# Patient Record
Sex: MALE | Race: WHITE | NOT HISPANIC OR LATINO | Employment: OTHER | RURAL
[De-identification: names, ages, dates, MRNs, and addresses within clinical notes are randomized per-mention and may not be internally consistent; named-entity substitution may affect disease eponyms.]

---

## 2021-04-06 ENCOUNTER — OFFICE VISIT (OUTPATIENT)
Dept: FAMILY MEDICINE | Facility: CLINIC | Age: 71
End: 2021-04-06
Payer: MEDICARE

## 2021-04-06 VITALS
SYSTOLIC BLOOD PRESSURE: 146 MMHG | HEART RATE: 80 BPM | RESPIRATION RATE: 18 BRPM | BODY MASS INDEX: 30.8 KG/M2 | HEIGHT: 71 IN | WEIGHT: 220 LBS | DIASTOLIC BLOOD PRESSURE: 84 MMHG

## 2021-04-06 DIAGNOSIS — R60.0 PEDAL EDEMA: ICD-10-CM

## 2021-04-06 DIAGNOSIS — I87.2 VENOUS STASIS DERMATITIS OF RIGHT LOWER EXTREMITY: Primary | ICD-10-CM

## 2021-04-06 PROCEDURE — 99213 PR OFFICE/OUTPT VISIT, EST, LEVL III, 20-29 MIN: ICD-10-PCS | Mod: ,,, | Performed by: FAMILY MEDICINE

## 2021-04-06 PROCEDURE — 99213 OFFICE O/P EST LOW 20 MIN: CPT | Mod: ,,, | Performed by: FAMILY MEDICINE

## 2021-04-06 RX ORDER — TRIAMCINOLONE ACETONIDE 1 MG/G
OINTMENT TOPICAL 2 TIMES DAILY
Qty: 80 G | Refills: 5 | Status: SHIPPED | OUTPATIENT
Start: 2021-04-06 | End: 2024-02-05

## 2021-04-06 RX ORDER — TRAMADOL HYDROCHLORIDE 50 MG/1
50 TABLET ORAL EVERY 6 HOURS PRN
COMMUNITY
End: 2021-07-28 | Stop reason: SDUPTHER

## 2021-04-06 RX ORDER — ASPIRIN 325 MG
325 TABLET ORAL DAILY
COMMUNITY
End: 2024-02-05

## 2021-07-28 ENCOUNTER — OFFICE VISIT (OUTPATIENT)
Dept: FAMILY MEDICINE | Facility: CLINIC | Age: 71
End: 2021-07-28
Payer: MEDICARE

## 2021-07-28 VITALS
DIASTOLIC BLOOD PRESSURE: 100 MMHG | TEMPERATURE: 61 F | BODY MASS INDEX: 31.36 KG/M2 | HEART RATE: 80 BPM | SYSTOLIC BLOOD PRESSURE: 160 MMHG | HEIGHT: 71 IN | WEIGHT: 224 LBS | RESPIRATION RATE: 16 BRPM

## 2021-07-28 DIAGNOSIS — I87.2 VENOUS STASIS DERMATITIS OF RIGHT LOWER EXTREMITY: ICD-10-CM

## 2021-07-28 DIAGNOSIS — Z79.899 ENCOUNTER FOR LONG-TERM (CURRENT) USE OF OTHER MEDICATIONS: Primary | ICD-10-CM

## 2021-07-28 DIAGNOSIS — M54.9 BACK PAIN, UNSPECIFIED BACK LOCATION, UNSPECIFIED BACK PAIN LATERALITY, UNSPECIFIED CHRONICITY: ICD-10-CM

## 2021-07-28 LAB

## 2021-07-28 PROCEDURE — 99213 OFFICE O/P EST LOW 20 MIN: CPT | Mod: ,,, | Performed by: FAMILY MEDICINE

## 2021-07-28 PROCEDURE — 80305 DRUG TEST PRSMV DIR OPT OBS: CPT | Mod: RHCUB | Performed by: FAMILY MEDICINE

## 2021-07-28 PROCEDURE — 99213 PR OFFICE/OUTPT VISIT, EST, LEVL III, 20-29 MIN: ICD-10-PCS | Mod: ,,, | Performed by: FAMILY MEDICINE

## 2021-07-28 RX ORDER — TRAMADOL HYDROCHLORIDE 50 MG/1
50 TABLET ORAL EVERY 6 HOURS PRN
Qty: 120 TABLET | Refills: 2 | Status: SHIPPED | OUTPATIENT
Start: 2021-07-28 | End: 2021-11-24 | Stop reason: SDUPTHER

## 2021-07-28 RX ORDER — MUPIROCIN 20 MG/G
OINTMENT TOPICAL 3 TIMES DAILY
Qty: 30 G | Refills: 5 | Status: SHIPPED | OUTPATIENT
Start: 2021-07-28

## 2021-09-07 ENCOUNTER — OFFICE VISIT (OUTPATIENT)
Dept: FAMILY MEDICINE | Facility: CLINIC | Age: 71
End: 2021-09-07
Payer: MEDICARE

## 2021-09-07 VITALS
WEIGHT: 224 LBS | DIASTOLIC BLOOD PRESSURE: 94 MMHG | SYSTOLIC BLOOD PRESSURE: 160 MMHG | HEART RATE: 72 BPM | RESPIRATION RATE: 16 BRPM | BODY MASS INDEX: 31.36 KG/M2 | HEIGHT: 71 IN

## 2021-09-07 DIAGNOSIS — I87.319 CHRONIC VENOUS HYPERTENSION W ULCERATION: ICD-10-CM

## 2021-09-07 DIAGNOSIS — L03.115 CELLULITIS OF RIGHT LOWER EXTREMITY: Primary | ICD-10-CM

## 2021-09-07 DIAGNOSIS — L97.909 CHRONIC VENOUS HYPERTENSION W ULCERATION: ICD-10-CM

## 2021-09-07 PROCEDURE — 99213 PR OFFICE/OUTPT VISIT, EST, LEVL III, 20-29 MIN: ICD-10-PCS | Mod: ,,, | Performed by: FAMILY MEDICINE

## 2021-09-07 PROCEDURE — 99213 OFFICE O/P EST LOW 20 MIN: CPT | Mod: ,,, | Performed by: FAMILY MEDICINE

## 2021-09-07 RX ORDER — CEPHALEXIN 500 MG/1
500 CAPSULE ORAL 4 TIMES DAILY
Qty: 40 CAPSULE | Refills: 0 | OUTPATIENT
Start: 2021-09-07 | End: 2023-08-09

## 2021-09-07 RX ORDER — MUPIROCIN 20 MG/G
OINTMENT TOPICAL 3 TIMES DAILY
Qty: 30 G | Refills: 2 | Status: SHIPPED | OUTPATIENT
Start: 2021-09-07 | End: 2024-02-05

## 2021-09-07 RX ORDER — HYDROCODONE BITARTRATE AND ACETAMINOPHEN 7.5; 325 MG/1; MG/1
1 TABLET ORAL EVERY 6 HOURS PRN
Qty: 15 TABLET | Refills: 0 | Status: SHIPPED | OUTPATIENT
Start: 2021-09-07 | End: 2022-11-01 | Stop reason: SDUPTHER

## 2021-11-24 ENCOUNTER — OFFICE VISIT (OUTPATIENT)
Dept: FAMILY MEDICINE | Facility: CLINIC | Age: 71
End: 2021-11-24
Payer: MEDICARE

## 2021-11-24 VITALS
RESPIRATION RATE: 16 BRPM | DIASTOLIC BLOOD PRESSURE: 98 MMHG | HEIGHT: 71 IN | WEIGHT: 230 LBS | TEMPERATURE: 98 F | BODY MASS INDEX: 32.2 KG/M2 | HEART RATE: 80 BPM | SYSTOLIC BLOOD PRESSURE: 170 MMHG

## 2021-11-24 DIAGNOSIS — Z79.899 ENCOUNTER FOR LONG-TERM (CURRENT) USE OF OTHER MEDICATIONS: Primary | ICD-10-CM

## 2021-11-24 DIAGNOSIS — M54.9 BACK PAIN, UNSPECIFIED BACK LOCATION, UNSPECIFIED BACK PAIN LATERALITY, UNSPECIFIED CHRONICITY: ICD-10-CM

## 2021-11-24 LAB
CTP QC/QA: YES
POC (AMP) AMPHETAMINE: NEGATIVE
POC (BAR) BARBITURATES: NEGATIVE
POC (BUP) BUPRENORPHINE: NEGATIVE
POC (BZO) BENZODIAZEPINES: NEGATIVE
POC (COC) COCAINE: NEGATIVE
POC (MDMA) METHYLENEDIOXYMETHAMPHETAMINE 3,4: NEGATIVE
POC (MET) METHAMPHETAMINE: NEGATIVE
POC (MOP) OPIATES: NEGATIVE
POC (MTD) METHADONE: NEGATIVE
POC (OXY) OXYCODONE: NEGATIVE
POC (PCP) PHENCYCLIDINE: NEGATIVE
POC (TCA) TRICYCLIC ANTIDEPRESSANTS: NORMAL
POC TEMPERATURE (URINE): 94
POC THC: NEGATIVE

## 2021-11-24 PROCEDURE — 80305 DRUG TEST PRSMV DIR OPT OBS: CPT | Mod: RHCUB | Performed by: FAMILY MEDICINE

## 2021-11-24 PROCEDURE — 99213 OFFICE O/P EST LOW 20 MIN: CPT | Mod: ,,, | Performed by: FAMILY MEDICINE

## 2021-11-24 PROCEDURE — 99213 PR OFFICE/OUTPT VISIT, EST, LEVL III, 20-29 MIN: ICD-10-PCS | Mod: ,,, | Performed by: FAMILY MEDICINE

## 2021-11-24 RX ORDER — TRAMADOL HYDROCHLORIDE 50 MG/1
50 TABLET ORAL EVERY 6 HOURS PRN
Qty: 120 TABLET | Refills: 5 | Status: SHIPPED | OUTPATIENT
Start: 2021-11-24 | End: 2022-05-17 | Stop reason: SDUPTHER

## 2021-11-29 PROBLEM — M54.9 BACK PAIN: Status: ACTIVE | Noted: 2021-11-29

## 2022-03-11 DIAGNOSIS — Z71.89 COMPLEX CARE COORDINATION: ICD-10-CM

## 2022-05-17 ENCOUNTER — OFFICE VISIT (OUTPATIENT)
Dept: FAMILY MEDICINE | Facility: CLINIC | Age: 72
End: 2022-05-17
Payer: MEDICARE

## 2022-05-17 VITALS
DIASTOLIC BLOOD PRESSURE: 82 MMHG | HEART RATE: 72 BPM | SYSTOLIC BLOOD PRESSURE: 164 MMHG | BODY MASS INDEX: 31.22 KG/M2 | RESPIRATION RATE: 16 BRPM | HEIGHT: 71 IN | WEIGHT: 223 LBS

## 2022-05-17 DIAGNOSIS — H61.91 SKIN LESION OF RIGHT EAR: ICD-10-CM

## 2022-05-17 DIAGNOSIS — M54.9 BACK PAIN, UNSPECIFIED BACK LOCATION, UNSPECIFIED BACK PAIN LATERALITY, UNSPECIFIED CHRONICITY: Primary | ICD-10-CM

## 2022-05-17 DIAGNOSIS — L03.115 CELLULITIS OF RIGHT LOWER EXTREMITY: ICD-10-CM

## 2022-05-17 DIAGNOSIS — Z79.899 ENCOUNTER FOR LONG-TERM (CURRENT) USE OF OTHER MEDICATIONS: ICD-10-CM

## 2022-05-17 DIAGNOSIS — I10 PRIMARY HYPERTENSION: ICD-10-CM

## 2022-05-17 LAB
CTP QC/QA: YES
POC (AMP) AMPHETAMINE: NEGATIVE
POC (BAR) BARBITURATES: NEGATIVE
POC (BUP) BUPRENORPHINE: NEGATIVE
POC (BZO) BENZODIAZEPINES: NEGATIVE
POC (COC) COCAINE: NEGATIVE
POC (MDMA) METHYLENEDIOXYMETHAMPHETAMINE 3,4: NEGATIVE
POC (MET) METHAMPHETAMINE: NEGATIVE
POC (MOP) OPIATES: NEGATIVE
POC (MTD) METHADONE: NEGATIVE
POC (OXY) OXYCODONE: NEGATIVE
POC (PCP) PHENCYCLIDINE: NEGATIVE
POC (TCA) TRICYCLIC ANTIDEPRESSANTS: NORMAL
POC TEMPERATURE (URINE): 90
POC THC: NEGATIVE

## 2022-05-17 PROCEDURE — 99213 PR OFFICE/OUTPT VISIT, EST, LEVL III, 20-29 MIN: ICD-10-PCS | Mod: ,,, | Performed by: FAMILY MEDICINE

## 2022-05-17 PROCEDURE — 99213 OFFICE O/P EST LOW 20 MIN: CPT | Mod: ,,, | Performed by: FAMILY MEDICINE

## 2022-05-17 PROCEDURE — 80305 DRUG TEST PRSMV DIR OPT OBS: CPT | Mod: RHCUB | Performed by: FAMILY MEDICINE

## 2022-05-17 RX ORDER — LISINOPRIL 10 MG/1
10 TABLET ORAL DAILY
Qty: 90 TABLET | Refills: 3 | Status: SHIPPED | OUTPATIENT
Start: 2022-05-17 | End: 2023-07-24 | Stop reason: SDUPTHER

## 2022-05-17 RX ORDER — TRAMADOL HYDROCHLORIDE 50 MG/1
TABLET ORAL
Qty: 150 TABLET | Refills: 5 | Status: SHIPPED | OUTPATIENT
Start: 2022-05-17 | End: 2022-11-01 | Stop reason: SDUPTHER

## 2022-05-18 NOTE — PROGRESS NOTES
Moises Lei MD        PATIENT NAME: Brandt Nicole  : 1950  DATE: 22  MRN: [id number]      Billing Provider: Moises Lei MD  Level of Service: HI OFFICE/OUTPT VISIT, EST, LEVL III, 20-29 MIN  Patient PCP Information     Provider PCP Type    Moises Lei MD General          Reason for Visit / Chief Complaint: Chronic Pain (3 month med check.)       Update PCP  Update Chief Complaint         History of Present Illness / Problem Focused Workflow     Brandt Nicole presents to the clinic with Chronic Pain (3 month med check.)     Patient' follow-up chronic back and knee pain.  Doing well on current medications.      Review of Systems     Review of Systems   Constitutional: Negative for activity change, appetite change, fever and unexpected weight change.   HENT: Negative for congestion, rhinorrhea, sinus pressure, sinus pain, sore throat and trouble swallowing.    Eyes: Negative for photophobia, pain, discharge and visual disturbance.   Respiratory: Negative for cough, chest tightness, wheezing and stridor.    Cardiovascular: Negative for chest pain, palpitations and leg swelling.   Gastrointestinal: Negative for abdominal pain, blood in stool, constipation, diarrhea and nausea.   Endocrine: Negative for polydipsia, polyphagia and polyuria.   Genitourinary: Negative for difficulty urinating, flank pain and hematuria.   Musculoskeletal: Positive for arthralgias and back pain. Negative for neck pain.   Skin: Negative for rash.   Allergic/Immunologic: Negative for food allergies.   Neurological: Negative for dizziness, tremors, seizures, syncope, weakness (global weakness) and headaches.   Psychiatric/Behavioral: Negative for behavioral problems, confusion, decreased concentration, dysphoric mood and hallucinations. The patient is not nervous/anxious.         Medical / Social / Family History   History reviewed. No pertinent past medical history.    No past surgical history on file.    Social  History    reports that he has quit smoking. He has never used smokeless tobacco. He reports that he does not drink alcohol.    Family History  Mr.'s family history is not on file.    Medications and Allergies     Medications  No outpatient medications have been marked as taking for the 5/17/22 encounter (Office Visit) with Moises Lei MD.       Allergies  Review of patient's allergies indicates:   Allergen Reactions    Metoclopramide Other (See Comments)     Panic attack    Tamsulosin hcl Other (See Comments)     Panic attack    Levofloxacin      Anxiety attack       Physical Examination     Vitals:    05/17/22 0846   BP: (!) 164/82   Pulse:    Resp:      Physical Exam  Constitutional:       General: He is not in acute distress.     Appearance: Normal appearance.   HENT:      Head: Normocephalic.      Right Ear: Tympanic membrane and ear canal normal.      Left Ear: Tympanic membrane and ear canal normal.      Nose: Nose normal.      Mouth/Throat:      Mouth: Mucous membranes are moist.      Pharynx: No oropharyngeal exudate.   Eyes:      Extraocular Movements: Extraocular movements intact.      Pupils: Pupils are equal, round, and reactive to light.   Cardiovascular:      Rate and Rhythm: Normal rate and regular rhythm.      Heart sounds: No murmur heard.  Pulmonary:      Effort: Pulmonary effort is normal.      Breath sounds: Normal breath sounds. No wheezing.   Abdominal:      General: Abdomen is flat. Bowel sounds are normal.      Palpations: Abdomen is soft.      Hernia: No hernia is present.   Musculoskeletal:         General: Normal range of motion.      Cervical back: Normal range of motion and neck supple.      Right lower leg: No edema.      Left lower leg: No edema.   Lymphadenopathy:      Cervical: No cervical adenopathy.   Skin:     General: Skin is warm and dry.      Coloration: Skin is not jaundiced.      Findings: No lesion.   Neurological:      General: No focal deficit present.       Mental Status: He is alert and oriented to person, place, and time.      Cranial Nerves: No cranial nerve deficit.      Gait: Gait normal.   Psychiatric:         Mood and Affect: Mood normal.         Behavior: Behavior normal.         Judgment: Judgment normal.          Assessment and Plan (including Health Maintenance)      Problem List  Smart Sets  Document Outside HM   :    Plan:   Back pain, unspecified back location, unspecified back pain laterality, unspecified chronicity  -     traMADoL (ULTRAM) 50 mg tablet; 1-2 tabs po q 6 hours prn pain  Dispense: 150 tablet; Refill: 5    Encounter for long-term (current) use of other medications  -     POCT Urine Drug Screen Presump    Cellulitis of right lower extremity    Skin lesion of right ear  -     Ambulatory referral/consult to Dermatology; Future; Expected date: 05/31/2022    Primary hypertension  -     lisinopriL 10 MG tablet; Take 1 tablet (10 mg total) by mouth once daily.  Dispense: 90 tablet; Refill: 3           Health Maintenance Due   Topic Date Due    Hepatitis C Screening  Never done    Lipid Panel  Never done    TETANUS VACCINE  Never done    Colorectal Cancer Screening  Never done    Shingles Vaccine (1 of 2) Never done    Pneumococcal Vaccines (Age 65+) (1 - PCV) Never done    Abdominal Aortic Aneurysm Screening  Never done    COVID-19 Vaccine (3 - Booster for Moderna series) 09/05/2021       Problem List Items Addressed This Visit        Orthopedic    Back pain - Primary    Relevant Medications    traMADoL (ULTRAM) 50 mg tablet      Other Visit Diagnoses     Encounter for long-term (current) use of other medications        Relevant Orders    POCT Urine Drug Screen Presump (Completed)    Cellulitis of right lower extremity        Skin lesion of right ear        Relevant Orders    Ambulatory referral/consult to Dermatology    Primary hypertension        Relevant Medications    lisinopriL 10 MG tablet          Health Maintenance Topics with due  status: Not Due       Topic Last Completion Date    Influenza Vaccine Not Due       Future Appointments   Date Time Provider Department Center   5/27/2022  8:00 AM Mary Ellen Salmeron MD [facility]        There are no Patient Instructions on file for this visit.  Follow up in about 6 months (around 11/17/2022) for routine followup.     Signature:  Moises Lei MD      Date of encounter: 5/17/22

## 2022-05-27 ENCOUNTER — OFFICE VISIT (OUTPATIENT)
Dept: DERMATOLOGY | Facility: CLINIC | Age: 72
End: 2022-05-27
Payer: MEDICARE

## 2022-05-27 VITALS — WEIGHT: 223.13 LBS | BODY MASS INDEX: 31.24 KG/M2 | HEIGHT: 71 IN | RESPIRATION RATE: 18 BRPM

## 2022-05-27 DIAGNOSIS — L82.1 OTHER SEBORRHEIC KERATOSIS: ICD-10-CM

## 2022-05-27 DIAGNOSIS — L57.0 ACTINIC KERATOSES: Primary | ICD-10-CM

## 2022-05-27 PROCEDURE — 99203 PR OFFICE/OUTPT VISIT, NEW, LEVL III, 30-44 MIN: ICD-10-PCS | Mod: 25,,, | Performed by: STUDENT IN AN ORGANIZED HEALTH CARE EDUCATION/TRAINING PROGRAM

## 2022-05-27 PROCEDURE — 17000 DESTRUCT PREMALG LESION: CPT | Mod: ,,, | Performed by: STUDENT IN AN ORGANIZED HEALTH CARE EDUCATION/TRAINING PROGRAM

## 2022-05-27 PROCEDURE — 17000 PR DESTRUCTION(LASER SURGERY,CRYOSURGERY,CHEMOSURGERY),PREMALIGNANT LESIONS,FIRST LESION: ICD-10-PCS | Mod: ,,, | Performed by: STUDENT IN AN ORGANIZED HEALTH CARE EDUCATION/TRAINING PROGRAM

## 2022-05-27 PROCEDURE — 99203 OFFICE O/P NEW LOW 30 MIN: CPT | Mod: 25,,, | Performed by: STUDENT IN AN ORGANIZED HEALTH CARE EDUCATION/TRAINING PROGRAM

## 2022-05-27 PROCEDURE — 17003 DESTRUCT PREMALG LES 2-14: CPT | Mod: ,,, | Performed by: STUDENT IN AN ORGANIZED HEALTH CARE EDUCATION/TRAINING PROGRAM

## 2022-05-27 PROCEDURE — 17003 DESTRUCTION, PREMALIGNANT LESIONS; SECOND THROUGH 14 LESIONS: ICD-10-PCS | Mod: ,,, | Performed by: STUDENT IN AN ORGANIZED HEALTH CARE EDUCATION/TRAINING PROGRAM

## 2022-05-27 NOTE — PROGRESS NOTES
[facility]  Jarret Salmeron MD    [address] [place] [address]  [phone]    Fax: [phone]    Patient Name: Brandt Nicole  Medical Record Number: [id number]  PCP: Moises Lei MD  Age: 71 y.o. : 1950  Contact: [phone] (home)     CC: lesion on right ear  History of Present Illness:     Brandt Nicole is a 71 y.o.  male with no history of skin cancer who presents to clinic today for lesion on right ear.  This has been present for multiple years. Symptoms include scaling and growth. Previous treatments include none. Other concerns today are none.      The patient has no other concerns today.    Review of Systems:     Unremarkable other than mentioned above.     Physical Exam:     General: Relaxed, oriented, alert    Skin examination of the scalp, face, neck, chest, back, abdomen, upper extremities and lower extremities were normal except for as listed below      Assessment and Plan:     1. Actinic Keratoses  Erythematous, scaly papules on the bilateral arms, right ear, left ear    Plan: Liquid Nitrogen.  A total of 5 lesions were treated with liquid nitrogen for 2 freeze-thaw cycles lasting 5 seconds, located on the above locations.   The patient's consent was obtained including but not limited to risks of crusting, scabbing,  blistering, scarring, darker or lighter pigmentary change, recurrence, incomplete removal and infection.    Counseling.  Sun protective clothing and broad spectrum sunscreen can prevent the formation of AK.   AKs can be treated with cryotherapy, photodynamic therapy, imiquimod, topical 5-FU.  Actinic Keratoses are precancerous proliferations that occur within sun damaged skin. If untreated,  a small subset of AKs can develop into Squamous Cell Carcinoma.      2. Seborrheic keratoses   - brown stuck on appearing papules/plaques  - patient educated on benign nature. No treatment necessary unless they become irritated or inflamed        Return to clinic in 1 year.    AVS printed with patient instructions     Jarret Salmeron MD   Mohs Surgery/Dermatologic Oncology  Dermatology

## 2022-10-09 DIAGNOSIS — Z71.89 COMPLEX CARE COORDINATION: ICD-10-CM

## 2022-11-01 ENCOUNTER — OFFICE VISIT (OUTPATIENT)
Dept: FAMILY MEDICINE | Facility: CLINIC | Age: 72
End: 2022-11-01
Payer: MEDICARE

## 2022-11-01 VITALS
RESPIRATION RATE: 16 BRPM | HEIGHT: 71 IN | SYSTOLIC BLOOD PRESSURE: 146 MMHG | WEIGHT: 218.63 LBS | HEART RATE: 72 BPM | DIASTOLIC BLOOD PRESSURE: 80 MMHG | BODY MASS INDEX: 30.61 KG/M2

## 2022-11-01 DIAGNOSIS — Z85.038 ENCOUNTER FOR COLONOSCOPY DUE TO HISTORY OF COLON CANCER: ICD-10-CM

## 2022-11-01 DIAGNOSIS — Z79.899 ENCOUNTER FOR LONG-TERM (CURRENT) USE OF OTHER MEDICATIONS: ICD-10-CM

## 2022-11-01 DIAGNOSIS — M54.9 BACK PAIN, UNSPECIFIED BACK LOCATION, UNSPECIFIED BACK PAIN LATERALITY, UNSPECIFIED CHRONICITY: Primary | ICD-10-CM

## 2022-11-01 DIAGNOSIS — Z12.11 ENCOUNTER FOR COLONOSCOPY DUE TO HISTORY OF COLON CANCER: ICD-10-CM

## 2022-11-01 DIAGNOSIS — L03.115 CELLULITIS OF RIGHT LOWER EXTREMITY: ICD-10-CM

## 2022-11-01 LAB
CTP QC/QA: YES
POC (AMP) AMPHETAMINE: NEGATIVE
POC (BAR) BARBITURATES: NEGATIVE
POC (BUP) BUPRENORPHINE: NEGATIVE
POC (BZO) BENZODIAZEPINES: NEGATIVE
POC (COC) COCAINE: NEGATIVE
POC (MDMA) METHYLENEDIOXYMETHAMPHETAMINE 3,4: NEGATIVE
POC (MET) METHAMPHETAMINE: NEGATIVE
POC (MOP) OPIATES: NEGATIVE
POC (MTD) METHADONE: NEGATIVE
POC (OXY) OXYCODONE: NEGATIVE
POC (PCP) PHENCYCLIDINE: NEGATIVE
POC (TCA) TRICYCLIC ANTIDEPRESSANTS: NORMAL
POC TEMPERATURE (URINE): 92
POC THC: NEGATIVE

## 2022-11-01 PROCEDURE — 99213 PR OFFICE/OUTPT VISIT, EST, LEVL III, 20-29 MIN: ICD-10-PCS | Mod: ,,, | Performed by: FAMILY MEDICINE

## 2022-11-01 PROCEDURE — 99213 OFFICE O/P EST LOW 20 MIN: CPT | Mod: ,,, | Performed by: FAMILY MEDICINE

## 2022-11-01 PROCEDURE — 80305 DRUG TEST PRSMV DIR OPT OBS: CPT | Mod: RHCUB | Performed by: FAMILY MEDICINE

## 2022-11-01 RX ORDER — HYDROCODONE BITARTRATE AND ACETAMINOPHEN 7.5; 325 MG/1; MG/1
1 TABLET ORAL EVERY 6 HOURS PRN
Qty: 15 TABLET | Refills: 0 | Status: SHIPPED | OUTPATIENT
Start: 2022-11-01 | End: 2024-02-05

## 2022-11-01 RX ORDER — TRAMADOL HYDROCHLORIDE 50 MG/1
TABLET ORAL
Qty: 150 TABLET | Refills: 5 | Status: SHIPPED | OUTPATIENT
Start: 2022-11-01 | End: 2023-05-01 | Stop reason: SDUPTHER

## 2022-11-01 NOTE — PROGRESS NOTES
Moises Lei MD        PATIENT NAME: Brandt Nicole  : 1950  DATE: 22  MRN: [id number]      Billing Provider: Moises Lei MD  Level of Service: ME OFFICE/OUTPT VISIT, EST, LEVL III, 20-29 MIN  Patient PCP Information       Provider PCP Type    Moises Lei MD General            Reason for Visit / Chief Complaint: Chronic Pain (3 month med check.)       Update PCP  Update Chief Complaint         History of Present Illness / Problem Focused Workflow     Brandt Nicole presents to the clinic with Chronic Pain (3 month med check.)     Routine followup.  No significant interval change.      Chronic Pain  Associated symptoms: no abdominal pain, no chest pain, no constipation, no dizziness, no headaches, no weakness (global weakness) and no nausea      Review of Systems     Review of Systems   Constitutional:  Negative for activity change, appetite change, fever and unexpected weight change.   HENT:  Negative for congestion, rhinorrhea, sinus pressure, sinus pain, sore throat and trouble swallowing.    Eyes:  Negative for photophobia, pain, discharge and visual disturbance.   Respiratory:  Negative for cough, chest tightness, wheezing and stridor.    Cardiovascular:  Negative for chest pain, palpitations and leg swelling.   Gastrointestinal:  Negative for abdominal pain, blood in stool, constipation, diarrhea and nausea.   Endocrine: Negative for polydipsia, polyphagia and polyuria.   Genitourinary:  Negative for difficulty urinating, flank pain and hematuria.   Musculoskeletal:  Positive for arthralgias and back pain. Negative for neck pain.   Skin:  Negative for rash.   Allergic/Immunologic: Negative for food allergies.   Neurological:  Negative for dizziness, tremors, seizures, syncope, weakness (global weakness) and headaches.   Psychiatric/Behavioral:  Negative for behavioral problems, confusion, decreased concentration, dysphoric mood and hallucinations. The patient is not nervous/anxious.        Medical / Social / Family History     Past Medical History:   Diagnosis Date    Essential (primary) hypertension        History reviewed. No pertinent surgical history.    Social History    reports that he has quit smoking. He has never used smokeless tobacco. He reports that he does not drink alcohol.    Family History  's family history is not on file.    Medications and Allergies     Medications  No outpatient medications have been marked as taking for the 11/1/22 encounter (Office Visit) with Moises Lei MD.       Allergies  Review of patient's allergies indicates:   Allergen Reactions    Metoclopramide Other (See Comments)     Panic attack    Tamsulosin hcl Other (See Comments)     Panic attack    Levofloxacin      Anxiety attack       Physical Examination     Vitals:    11/01/22 0907   BP: (!) 146/80   Pulse: 72   Resp: 16     Physical Exam  Constitutional:       General: He is not in acute distress.     Appearance: Normal appearance.   HENT:      Head: Normocephalic.      Right Ear: Tympanic membrane and ear canal normal.      Left Ear: Tympanic membrane and ear canal normal.      Nose: Nose normal.      Mouth/Throat:      Mouth: Mucous membranes are moist.      Pharynx: No oropharyngeal exudate.   Eyes:      Extraocular Movements: Extraocular movements intact.      Pupils: Pupils are equal, round, and reactive to light.   Cardiovascular:      Rate and Rhythm: Normal rate and regular rhythm.      Heart sounds: No murmur heard.  Pulmonary:      Effort: Pulmonary effort is normal.      Breath sounds: Normal breath sounds. No wheezing.   Abdominal:      General: Abdomen is flat. Bowel sounds are normal.      Palpations: Abdomen is soft.      Hernia: No hernia is present.   Musculoskeletal:         General: Normal range of motion.      Cervical back: Normal range of motion and neck supple.      Right lower leg: No edema.      Left lower leg: No edema.   Lymphadenopathy:      Cervical: No cervical  adenopathy.   Skin:     General: Skin is warm and dry.      Coloration: Skin is not jaundiced.      Findings: No lesion.   Neurological:      General: No focal deficit present.      Mental Status: He is alert and oriented to person, place, and time.      Cranial Nerves: No cranial nerve deficit.      Gait: Gait normal.   Psychiatric:         Mood and Affect: Mood normal.         Behavior: Behavior normal.         Judgment: Judgment normal.        Assessment and Plan (including Health Maintenance)      Problem List  Smart Sets  Document Outside HM   :    Plan:   Back pain, unspecified back location, unspecified back pain laterality, unspecified chronicity  -     traMADoL (ULTRAM) 50 mg tablet; 1-2 tabs po q 6 hours prn pain  Dispense: 150 tablet; Refill: 5    Encounter for long-term (current) use of other medications  -     POCT Urine Drug Screen Presump    Cellulitis of right lower extremity  -     HYDROcodone-acetaminophen (NORCO) 7.5-325 mg per tablet; Take 1 tablet by mouth every 6 (six) hours as needed for Pain.  Dispense: 15 tablet; Refill: 0    Encounter for colonoscopy due to history of colon cancer  -     Ambulatory referral/consult to Gastroenterology; Future; Expected date: 11/15/2022         Health Maintenance Due   Topic Date Due    Hepatitis C Screening  Never done    Lipid Panel  Never done    TETANUS VACCINE  Never done    Shingles Vaccine (1 of 2) Never done    Pneumococcal Vaccines (Age 65+) (1 - PCV) Never done    Abdominal Aortic Aneurysm Screening  Never done    COVID-19 Vaccine (3 - Booster for Moderna series) 05/31/2021       Problem List Items Addressed This Visit          Orthopedic    Back pain - Primary    Relevant Medications    traMADoL (ULTRAM) 50 mg tablet     Other Visit Diagnoses       Encounter for long-term (current) use of other medications        Cellulitis of right lower extremity        Relevant Medications    HYDROcodone-acetaminophen (NORCO) 7.5-325 mg per tablet    Encounter  for colonoscopy due to history of colon cancer        Relevant Orders    Ambulatory referral/consult to Gastroenterology            The patient has no Health Maintenance topics of status Not Due    Future Appointments   Date Time Provider Department Center   5/1/2023  7:30 AM Moises Lei MD [facility]   5/30/2023  8:00 AM Mary Ellen Salmeron MD [facility]        There are no Patient Instructions on file for this visit.  Follow up in about 6 months (around 5/1/2023) for routine followup.     Signature:  Moises Lei MD      Date of encounter: 11/1/22

## 2022-11-10 DIAGNOSIS — Z12.11 COLON CANCER SCREENING: Primary | ICD-10-CM

## 2022-11-10 RX ORDER — POLYETHYLENE GLYCOL 3350, SODIUM SULFATE ANHYDROUS, SODIUM BICARBONATE, SODIUM CHLORIDE, POTASSIUM CHLORIDE 236; 22.74; 6.74; 5.86; 2.97 G/4L; G/4L; G/4L; G/4L; G/4L
4 POWDER, FOR SOLUTION ORAL ONCE
Qty: 4000 ML | Refills: 0 | Status: SHIPPED | OUTPATIENT
Start: 2022-11-10 | End: 2022-11-10

## 2023-01-09 ENCOUNTER — ANESTHESIA (OUTPATIENT)
Dept: GASTROENTEROLOGY | Facility: HOSPITAL | Age: 73
End: 2023-01-09
Payer: MEDICARE

## 2023-01-09 ENCOUNTER — HOSPITAL ENCOUNTER (OUTPATIENT)
Dept: GASTROENTEROLOGY | Facility: HOSPITAL | Age: 73
Discharge: HOME | End: 2023-01-09
Attending: INTERNAL MEDICINE
Payer: MEDICARE

## 2023-01-09 ENCOUNTER — ANESTHESIA EVENT (OUTPATIENT)
Dept: GASTROENTEROLOGY | Facility: HOSPITAL | Age: 73
End: 2023-01-09
Payer: MEDICARE

## 2023-01-09 VITALS
SYSTOLIC BLOOD PRESSURE: 145 MMHG | TEMPERATURE: 97 F | DIASTOLIC BLOOD PRESSURE: 82 MMHG | RESPIRATION RATE: 12 BRPM | HEART RATE: 77 BPM | OXYGEN SATURATION: 97 %

## 2023-01-09 DIAGNOSIS — Z12.11 COLON CANCER SCREENING: ICD-10-CM

## 2023-01-09 PROCEDURE — 63600175 PHARM REV CODE 636 W HCPCS: Performed by: NURSE ANESTHETIST, CERTIFIED REGISTERED

## 2023-01-09 PROCEDURE — 37000008 HC ANESTHESIA 1ST 15 MINUTES

## 2023-01-09 PROCEDURE — G0105 COLORECTAL SCRN; HI RISK IND: HCPCS | Performed by: INTERNAL MEDICINE

## 2023-01-09 PROCEDURE — D9220A PRA ANESTHESIA: ICD-10-PCS | Mod: ,,, | Performed by: NURSE ANESTHETIST, CERTIFIED REGISTERED

## 2023-01-09 PROCEDURE — G0105 COLORECTAL SCRN; HI RISK IND: HCPCS | Mod: ,,, | Performed by: INTERNAL MEDICINE

## 2023-01-09 PROCEDURE — 37000009 HC ANESTHESIA EA ADD 15 MINS

## 2023-01-09 PROCEDURE — G0105 COLORECTAL SCRN; HI RISK IND: ICD-10-PCS | Mod: ,,, | Performed by: INTERNAL MEDICINE

## 2023-01-09 PROCEDURE — 25000003 PHARM REV CODE 250: Performed by: NURSE ANESTHETIST, CERTIFIED REGISTERED

## 2023-01-09 PROCEDURE — 27000284 HC CANNULA NASAL: Performed by: NURSE ANESTHETIST, CERTIFIED REGISTERED

## 2023-01-09 PROCEDURE — D9220A PRA ANESTHESIA: Mod: ,,, | Performed by: NURSE ANESTHETIST, CERTIFIED REGISTERED

## 2023-01-09 RX ORDER — PROPOFOL 10 MG/ML
VIAL (ML) INTRAVENOUS
Status: DISCONTINUED | OUTPATIENT
Start: 2023-01-09 | End: 2023-01-09

## 2023-01-09 RX ORDER — LIDOCAINE HYDROCHLORIDE 20 MG/ML
INJECTION, SOLUTION EPIDURAL; INFILTRATION; INTRACAUDAL; PERINEURAL
Status: DISCONTINUED | OUTPATIENT
Start: 2023-01-09 | End: 2023-01-09

## 2023-01-09 RX ADMIN — PROPOFOL 50 MG: 10 INJECTION, EMULSION INTRAVENOUS at 10:01

## 2023-01-09 RX ADMIN — LIDOCAINE HYDROCHLORIDE 40 MG: 20 INJECTION, SOLUTION EPIDURAL; INFILTRATION; INTRACAUDAL; PERINEURAL at 10:01

## 2023-01-09 RX ADMIN — SODIUM CHLORIDE: 9 INJECTION, SOLUTION INTRAVENOUS at 10:01

## 2023-01-09 NOTE — H&P
Gastroenterology Pre-procedure H&P    Chief Complaint: Colon cancer screening    History of Present Illness    Brandt Nicole is a 72 y.o. male that  has a past medical history of Cancer and Essential (primary) hypertension.     Patient reports a personal h/o rectal cancer that he underwent APR with end sigmoid colostomy for in 2002 per patient; he did receive chemo and radiation and has been in remission since per patient.    Patient denies wt loss, abdominal pain, diarrhea, melena/hematochezia, change in stool caliber, no anticoagulants.      Past Medical History:   Diagnosis Date    Cancer     colon cancer.    Essential (primary) hypertension        Past Surgical History:   Procedure Laterality Date    PARTIAL HIP ARTHROPLASTY Right     TOTAL KNEE ARTHROPLASTY Bilateral        History reviewed. No pertinent family history.    Social History     Socioeconomic History    Marital status:    Tobacco Use    Smoking status: Former     Types: Cigarettes    Smokeless tobacco: Never   Substance and Sexual Activity    Alcohol use: Never    Drug use: Never       Current Outpatient Medications   Medication Sig Dispense Refill    aspirin 325 MG tablet Take 325 mg by mouth once daily.      aspirin 81 mg Cap Take 81 mg by mouth Daily.      cephALEXin (KEFLEX) 500 MG capsule Take 1 capsule (500 mg total) by mouth 4 (four) times daily. 40 capsule 0    HYDROcodone-acetaminophen (NORCO) 7.5-325 mg per tablet Take 1 tablet by mouth every 6 (six) hours as needed for Pain. 15 tablet 0    lisinopriL 10 MG tablet Take 1 tablet (10 mg total) by mouth once daily. 90 tablet 3    mupirocin (BACTROBAN) 2 % ointment Apply topically 3 (three) times daily. 30 g 5    mupirocin (BACTROBAN) 2 % ointment Apply topically 3 (three) times daily. 30 g 2    traMADoL (ULTRAM) 50 mg tablet 1-2 tabs po q 6 hours prn pain 150 tablet 5    triamcinolone acetonide 0.1% (KENALOG) 0.1 % ointment Apply topically 2 (two) times daily. 80 g 5     No current  facility-administered medications for this encounter.     Facility-Administered Medications Ordered in Other Encounters   Medication Dose Route Frequency Provider Last Rate Last Admin    sodium chloride 0.9% infusion   Intravenous Continuous PRN Danyell Yanes CRNA 500 mL/hr at 01/09/23 1017 New Bag at 01/09/23 1017       Review of patient's allergies indicates:   Allergen Reactions    Metoclopramide Other (See Comments)     Panic attack    Tamsulosin hcl Other (See Comments)     Panic attack    Levofloxacin      Anxiety attack       Objective:  Vitals:    01/09/23 1010   BP: (!) 159/89   Pulse: 81   Resp: 14   SpO2: 97%        GEN: normal appearing, NAD, AAO x3  HENT: NCAT, anicteric, OP benign  CV: normal rate, regular rhythm  RESP: NABS, symmetric rise, unlabored  ABD: soft, ND, no guarding or TTP  SKIN: warm and dry  NEURO: grossly afocal    Assessment and Plan:    Proceed with:    Colonoscopy for previous colon cancer    Scott Chow MD  Gastroenterology

## 2023-01-09 NOTE — TRANSFER OF CARE
Anesthesia Transfer of Care Note    Patient: Brandt Nicole    Procedure(s) Performed: * No procedures listed *    Patient location: GI    Anesthesia Type: general    Transport from OR: Transported from OR on room air with adequate spontaneous ventilation    Post pain: adequate analgesia    Post assessment: no apparent anesthetic complications    Post vital signs: stable    Level of consciousness: responds to stimulation    Nausea/Vomiting: no nausea/vomiting    Complications: none    Transfer of care protocol was followed      Last vitals:   Visit Vitals  /72 (BP Location: Left arm, Patient Position: Lying)   Pulse 78   Temp 36.1 °C (97 °F) (Oral)   Resp 15   SpO2 96%

## 2023-01-09 NOTE — DISCHARGE INSTRUCTIONS
Procedure Date  1/9/23     Impression  Overall Impression:   - Prior Abdominoperineal resection with end sigmoid colostomy, healthy appearing  - Mild sigmoid diverticulosis  - The exam was otherwise normal     Recommendation    Repeat colonoscopy in 5 years due to personal history of cancer      Outcome of procedure: successful Colonoscopy  Disposition: patient to recovery following procedure; discharge to home when appropriate parameters met  Provisions for follow up: please call my office for any unexpected symptoms like chest or abdominal pain or bleeding following your procedure.  Final Diagnosis: personal history of anorectal cancer    NO DRIVING, OPERATING EQUIPMENT, OR SIGNING LEGAL DOCUMENTS FOR 24 HOURS.

## 2023-01-09 NOTE — ANESTHESIA POSTPROCEDURE EVALUATION
Anesthesia Post Evaluation    Patient: Brandt Nicole    Procedure(s) Performed: * No procedures listed *    Final Anesthesia Type: general      Patient location during evaluation: GI PACU  Patient participation: Yes- Able to Participate  Level of consciousness: awake and alert  Post-procedure vital signs: reviewed and stable  Pain management: adequate  Airway patency: patent    PONV status at discharge: No PONV  Anesthetic complications: no      Cardiovascular status: blood pressure returned to baseline and hemodynamically stable  Respiratory status: spontaneous ventilation and unassisted  Hydration status: euvolemic  Follow-up not needed.          Vitals Value Taken Time   /82 01/09/23 1115   Temp 36.1 °C (97 °F) 01/09/23 1052   Pulse 77 01/09/23 1115   Resp 12 01/09/23 1115   SpO2 97 % 01/09/23 1115         No case tracking events are documented in the log.      Pain/Nereida Score: Nereida Score: 10 (1/9/2023 10:55 AM)

## 2023-01-09 NOTE — ANESTHESIA PREPROCEDURE EVALUATION
01/09/2023  Brandt Nicole is a 72 y.o., male.      Pre-op Assessment    I have reviewed the Patient Summary Reports.     I have reviewed the Nursing Notes. I have reviewed the NPO Status.   I have reviewed the Medications.     Review of Systems  Anesthesia Hx:  No problems with previous Anesthesia    Cardiovascular:   Hypertension    Hepatic/GI:   Bowel Prep.    Endocrine:  Obesity / BMI > 30    Past Medical History:   Diagnosis Date    Essential (primary) hypertension        No past surgical history on file.    No family history on file.    Social History     Socioeconomic History    Marital status:    Tobacco Use    Smoking status: Former    Smokeless tobacco: Never   Substance and Sexual Activity    Alcohol use: Never       Current Outpatient Medications   Medication Sig Dispense Refill    aspirin 325 MG tablet Take 325 mg by mouth once daily.      cephALEXin (KEFLEX) 500 MG capsule Take 1 capsule (500 mg total) by mouth 4 (four) times daily. 40 capsule 0    HYDROcodone-acetaminophen (NORCO) 7.5-325 mg per tablet Take 1 tablet by mouth every 6 (six) hours as needed for Pain. 15 tablet 0    lisinopriL 10 MG tablet Take 1 tablet (10 mg total) by mouth once daily. 90 tablet 3    mupirocin (BACTROBAN) 2 % ointment Apply topically 3 (three) times daily. 30 g 5    mupirocin (BACTROBAN) 2 % ointment Apply topically 3 (three) times daily. 30 g 2    traMADoL (ULTRAM) 50 mg tablet 1-2 tabs po q 6 hours prn pain 150 tablet 5    triamcinolone acetonide 0.1% (KENALOG) 0.1 % ointment Apply topically 2 (two) times daily. 80 g 5     No current facility-administered medications for this encounter.       Review of patient's allergies indicates:   Allergen Reactions    Metoclopramide Other (See Comments)     Panic attack    Tamsulosin hcl Other (See Comments)     Panic attack    Levofloxacin       Anxiety attack       Physical Exam  General: Well nourished, Cooperative, Alert and Oriented    Airway:  Mallampati: II   Mouth Opening: Normal  TM Distance: Normal  Tongue: Normal  Neck ROM: Normal ROM    Dental:  Intact        Anesthesia Plan  Type of Anesthesia, risks & benefits discussed:    Anesthesia Type: Gen Natural Airway, MAC  Intra-op Monitoring Plan: Standard ASA Monitors  Post Op Pain Control Plan: multimodal analgesia  Induction:  IV  Informed Consent: Informed consent signed with the Patient and all parties understand the risks and agree with anesthesia plan.  All questions answered. Patient consented to blood products? Yes  ASA Score: 3  Day of Surgery Review of History & Physical: I have interviewed and examined the patient. I have reviewed the patient's H&P dated: There are no significant changes.     Ready For Surgery From Anesthesia Perspective.     .

## 2023-04-17 ENCOUNTER — PATIENT OUTREACH (OUTPATIENT)
Dept: ADMINISTRATIVE | Facility: HOSPITAL | Age: 73
End: 2023-04-17

## 2023-04-17 NOTE — PROGRESS NOTES
04/17/2023 Care Everywhere updates requested and reviewed.  Immunizations reconciled. Media reports reviewed.  Duplicate HM overrides removed.  Overdue HM topic chart audit and/or requested.       Health Maintenance Due   Topic Date Due    Hepatitis C Screening  Never done    TETANUS VACCINE  Never done    Shingles Vaccine (1 of 2) Never done    Pneumococcal Vaccines (Age 65+) (1 - PCV) Never done    Abdominal Aortic Aneurysm Screening  Never done    COVID-19 Vaccine (3 - Booster for Moderna series) 05/31/2021

## 2023-05-01 ENCOUNTER — OFFICE VISIT (OUTPATIENT)
Dept: FAMILY MEDICINE | Facility: CLINIC | Age: 73
End: 2023-05-01
Payer: MEDICARE

## 2023-05-01 VITALS
WEIGHT: 220 LBS | OXYGEN SATURATION: 96 % | RESPIRATION RATE: 18 BRPM | BODY MASS INDEX: 30.8 KG/M2 | DIASTOLIC BLOOD PRESSURE: 86 MMHG | SYSTOLIC BLOOD PRESSURE: 146 MMHG | HEIGHT: 71 IN | HEART RATE: 70 BPM

## 2023-05-01 DIAGNOSIS — M54.9 BACK PAIN, UNSPECIFIED BACK LOCATION, UNSPECIFIED BACK PAIN LATERALITY, UNSPECIFIED CHRONICITY: Primary | ICD-10-CM

## 2023-05-01 DIAGNOSIS — Z79.899 ENCOUNTER FOR LONG-TERM (CURRENT) USE OF OTHER MEDICATIONS: ICD-10-CM

## 2023-05-01 PROCEDURE — 80305 DRUG TEST PRSMV DIR OPT OBS: CPT | Mod: RHCUB | Performed by: FAMILY MEDICINE

## 2023-05-01 PROCEDURE — 99213 OFFICE O/P EST LOW 20 MIN: CPT | Mod: ,,, | Performed by: FAMILY MEDICINE

## 2023-05-01 PROCEDURE — 99213 PR OFFICE/OUTPT VISIT, EST, LEVL III, 20-29 MIN: ICD-10-PCS | Mod: ,,, | Performed by: FAMILY MEDICINE

## 2023-05-01 RX ORDER — TRAMADOL HYDROCHLORIDE 50 MG/1
TABLET ORAL
Qty: 150 TABLET | Refills: 5 | Status: SHIPPED | OUTPATIENT
Start: 2023-05-01 | End: 2023-11-20 | Stop reason: SDUPTHER

## 2023-05-01 NOTE — PROGRESS NOTES
Moises Lei MD        PATIENT NAME: Brandt Nicole  : 1950  DATE: 23  MRN: [id number]      Billing Provider: Moises Lei MD  Level of Service: OR OFFICE/OUTPT VISIT, EST, LEVL III, 20-29 MIN  Patient PCP Information       Provider PCP Type    Moises Lei MD General            Reason for Visit / Chief Complaint: Back Pain (Check up for refills)       Update PCP  Update Chief Complaint         History of Present Illness / Problem Focused Workflow     Brandt Nicole presents to the clinic with Back Pain (Check up for refills)     Routine followup.  No significant interval change    Back Pain  Pertinent negatives include no abdominal pain, chest pain, fever, headaches or weakness (global weakness).   Review of Systems     Review of Systems   Constitutional:  Negative for activity change, appetite change, fever and unexpected weight change.   HENT:  Negative for congestion, rhinorrhea, sinus pressure, sinus pain, sore throat and trouble swallowing.    Eyes:  Negative for photophobia, pain, discharge and visual disturbance.   Respiratory:  Negative for cough, chest tightness, wheezing and stridor.    Cardiovascular:  Negative for chest pain, palpitations and leg swelling.   Gastrointestinal:  Negative for abdominal pain, blood in stool, constipation, diarrhea and nausea.   Endocrine: Negative for polydipsia, polyphagia and polyuria.   Genitourinary:  Negative for difficulty urinating, flank pain and hematuria.   Musculoskeletal:  Positive for back pain. Negative for arthralgias and neck pain.   Skin:  Negative for rash.   Allergic/Immunologic: Negative for food allergies.   Neurological:  Negative for dizziness, tremors, seizures, syncope, weakness (global weakness) and headaches.   Psychiatric/Behavioral:  Negative for behavioral problems, confusion, decreased concentration, dysphoric mood and hallucinations. The patient is not nervous/anxious.       Medical / Social / Family History     Past  Medical History:   Diagnosis Date    Cancer     colon cancer.    Essential (primary) hypertension        Past Surgical History:   Procedure Laterality Date    PARTIAL HIP ARTHROPLASTY Right     TOTAL KNEE ARTHROPLASTY Bilateral        Social History    reports that he has quit smoking. His smoking use included cigarettes. He has been exposed to tobacco smoke. He has never used smokeless tobacco. He reports that he does not drink alcohol and does not use drugs.    Family History  's family history is not on file.    Medications and Allergies     Medications  No outpatient medications have been marked as taking for the 5/1/23 encounter (Office Visit) with Moises Lei MD.       Allergies  Review of patient's allergies indicates:   Allergen Reactions    Metoclopramide Other (See Comments)     Panic attack    Tamsulosin hcl Other (See Comments)     Panic attack    Levofloxacin      Anxiety attack       Physical Examination     Vitals:    05/01/23 0754   BP: (!) 146/86   Pulse:    Resp:      Physical Exam  Constitutional:       General: He is not in acute distress.     Appearance: Normal appearance.   HENT:      Head: Normocephalic.      Right Ear: Tympanic membrane and ear canal normal.      Left Ear: Tympanic membrane and ear canal normal.      Nose: Nose normal.      Mouth/Throat:      Mouth: Mucous membranes are moist.      Pharynx: No oropharyngeal exudate.   Eyes:      Extraocular Movements: Extraocular movements intact.      Pupils: Pupils are equal, round, and reactive to light.   Cardiovascular:      Rate and Rhythm: Normal rate and regular rhythm.      Heart sounds: No murmur heard.  Pulmonary:      Effort: Pulmonary effort is normal.      Breath sounds: Normal breath sounds. No wheezing.   Abdominal:      General: Abdomen is flat. Bowel sounds are normal.      Palpations: Abdomen is soft.      Hernia: No hernia is present.   Musculoskeletal:         General: Normal range of motion.      Cervical  back: Normal range of motion and neck supple.      Right lower leg: No edema.      Left lower leg: No edema.   Lymphadenopathy:      Cervical: No cervical adenopathy.   Skin:     General: Skin is warm and dry.      Coloration: Skin is not jaundiced.      Findings: No lesion.   Neurological:      General: No focal deficit present.      Mental Status: He is alert and oriented to person, place, and time.      Cranial Nerves: No cranial nerve deficit.      Gait: Gait normal.   Psychiatric:         Mood and Affect: Mood normal.         Behavior: Behavior normal.         Judgment: Judgment normal.        Assessment and Plan (including Health Maintenance)      Problem List  Smart Sets  Document Outside HM   :    Plan:     1. Encounter for long-term (current) use of other medications  The current medical regimen is effective;  continue present plan and medications.  -     POCT Urine Drug Screen Presump    2. Back pain, unspecified back location, unspecified back pain laterality, unspecified chronicity  The current medical regimen is effective;  continue present plan and medications.  -     traMADoL (ULTRAM) 50 mg tablet; 1-2 tabs po q 6 hours prn pain  Dispense: 150 tablet; Refill: 5          Health Maintenance Due   Topic Date Due    Hepatitis C Screening  Never done    TETANUS VACCINE  Never done    Shingles Vaccine (1 of 2) Never done    Pneumococcal Vaccines (Age 65+) (1 - PCV) Never done    Abdominal Aortic Aneurysm Screening  Never done    COVID-19 Vaccine (3 - Booster for Moderna series) 05/31/2021       Problem List Items Addressed This Visit          Orthopedic    Back pain - Primary    Relevant Medications    traMADoL (ULTRAM) 50 mg tablet     Other Visit Diagnoses       Encounter for long-term (current) use of other medications        Relevant Orders    POCT Urine Drug Screen Presump (Completed)            Health Maintenance Topics with due status: Not Due       Topic Last Completion Date    Lipid Panel  02/05/2020       Future Appointments   Date Time Provider Department Center   5/30/2023  8:00 AM Mary Ellen Salmeron MD [facility]        There are no Patient Instructions on file for this visit.  Follow up in about 3 months (around 8/1/2023) for routine followup.     Signature:  Moises Lei MD      Date of encounter: 5/1/23

## 2023-05-09 DIAGNOSIS — Z71.89 COMPLEX CARE COORDINATION: ICD-10-CM

## 2023-07-24 DIAGNOSIS — I10 PRIMARY HYPERTENSION: ICD-10-CM

## 2023-07-24 RX ORDER — LISINOPRIL 10 MG/1
10 TABLET ORAL DAILY
Qty: 90 TABLET | Refills: 3 | Status: SHIPPED | OUTPATIENT
Start: 2023-07-24 | End: 2024-07-23

## 2023-08-09 ENCOUNTER — HOSPITAL ENCOUNTER (EMERGENCY)
Facility: HOSPITAL | Age: 73
Discharge: HOME | End: 2023-08-09
Payer: MEDICARE

## 2023-08-09 VITALS
OXYGEN SATURATION: 96 % | DIASTOLIC BLOOD PRESSURE: 94 MMHG | SYSTOLIC BLOOD PRESSURE: 144 MMHG | HEART RATE: 82 BPM | WEIGHT: 218 LBS | TEMPERATURE: 98 F | HEIGHT: 72 IN | BODY MASS INDEX: 29.53 KG/M2 | RESPIRATION RATE: 20 BRPM

## 2023-08-09 DIAGNOSIS — S61.422A LACERATION OF LEFT HAND WITH FOREIGN BODY, INITIAL ENCOUNTER: Primary | ICD-10-CM

## 2023-08-09 PROCEDURE — 12042 INTMD RPR N-HF/GENIT2.6-7.5: CPT

## 2023-08-09 PROCEDURE — 90715 TDAP VACCINE 7 YRS/> IM: CPT | Performed by: EMERGENCY MEDICINE

## 2023-08-09 PROCEDURE — 90471 IMMUNIZATION ADMIN: CPT | Performed by: EMERGENCY MEDICINE

## 2023-08-09 PROCEDURE — 99284 EMERGENCY DEPT VISIT MOD MDM: CPT | Mod: 25

## 2023-08-09 PROCEDURE — 63600175 PHARM REV CODE 636 W HCPCS: Performed by: EMERGENCY MEDICINE

## 2023-08-09 PROCEDURE — 25000003 PHARM REV CODE 250: Performed by: EMERGENCY MEDICINE

## 2023-08-09 PROCEDURE — 99284 EMERGENCY DEPT VISIT MOD MDM: CPT

## 2023-08-09 RX ORDER — CEPHALEXIN 250 MG/1
500 CAPSULE ORAL
Status: COMPLETED | OUTPATIENT
Start: 2023-08-09 | End: 2023-08-09

## 2023-08-09 RX ORDER — LIDOCAINE HYDROCHLORIDE 10 MG/ML
10 INJECTION INFILTRATION; PERINEURAL
Status: COMPLETED | OUTPATIENT
Start: 2023-08-09 | End: 2023-08-09

## 2023-08-09 RX ORDER — CEPHALEXIN 500 MG/1
500 CAPSULE ORAL 4 TIMES DAILY
Qty: 28 CAPSULE | Refills: 0 | Status: SHIPPED | OUTPATIENT
Start: 2023-08-09 | End: 2023-08-16

## 2023-08-09 RX ADMIN — NEOMYCIN, POLYMIXIN, BACITRACIN 1 EACH: 5; 400; 5000 OINTMENT TOPICAL at 06:08

## 2023-08-09 RX ADMIN — CEPHALEXIN 500 MG: 250 CAPSULE ORAL at 06:08

## 2023-08-09 RX ADMIN — LIDOCAINE HYDROCHLORIDE 10 ML: 10 INJECTION, SOLUTION INFILTRATION; PERINEURAL at 05:08

## 2023-08-09 RX ADMIN — TETANUS TOXOID, REDUCED DIPHTHERIA TOXOID AND ACELLULAR PERTUSSIS VACCINE, ADSORBED 0.5 ML: 5; 2.5; 8; 8; 2.5 SUSPENSION INTRAMUSCULAR at 04:08

## 2023-08-09 NOTE — DISCHARGE INSTRUCTIONS
ELEVATE  WOUND CARE DAILY AND APPLY NEOSPORIN  MEDICATIONS AS DIRECTED  TYLENOL AS NEEDED FOR PAIN

## 2023-08-09 NOTE — ED TRIAGE NOTES
C/o cut left palm of hand on a table saw approx 30-45 min pta. Bleeding controlled but oozes with release of pressure.  States he took tramadol pta for pain. States his tetanus shot is not up to date-will probably need one.

## 2023-08-09 NOTE — ED PROVIDER NOTES
Encounter Date: 8/9/2023       History     Chief Complaint   Patient presents with    Laceration     PT IS A 72 YR OLD WM WITH LACERATION L PALM ON TABLE SAW 30 MIN PTA, WHILE MAKING A WOODEN TOY. PT DENIES PARESTHESIAS OR DECREASED ROM.  PT DENIES ADDITIONAL COMPLAINTS. PT TOOK TRAMADOL PTA WITH IMPROVEMENT , PAIN IS INCREASED WITH ROM AND PALPATION AND DECREASED WITH REMAINING STATIONARY AND TRAMADOL.  TET IS NOT UTD  ON AC- LOW DOSE ASA    Past Medical History    Diagnosis Date Comments  Essential (primary) hypertension [I10]    Cancer [C80.1]  colon cancer.    Pertinent             Review of patient's allergies indicates:   Allergen Reactions    Metoclopramide Other (See Comments)     Panic attack    Tamsulosin hcl Other (See Comments)     Panic attack    Levofloxacin      Anxiety attack     Past Medical History:   Diagnosis Date    Cancer     colon cancer.    Essential (primary) hypertension      Past Surgical History:   Procedure Laterality Date    PARTIAL HIP ARTHROPLASTY Right     TOTAL KNEE ARTHROPLASTY Bilateral      No family history on file.  Social History     Tobacco Use    Smoking status: Former     Types: Cigarettes     Passive exposure: Past    Smokeless tobacco: Never   Substance Use Topics    Alcohol use: Never    Drug use: Never     Review of Systems    Physical Exam     Initial Vitals [08/09/23 1645]   BP Pulse Resp Temp SpO2   (!) 144/96 83 18 98.4 °F (36.9 °C) 96 %      MAP       --         Physical Exam    Medical Screening Exam   See Full Note    ED Course   Lac Repair    Date/Time: 8/9/2023 5:00 PM    Performed by: Pat Garcia MD  Authorized by: Pat Garcia MD    Consent:     Consent obtained:  Verbal    Consent given by:  Patient    Risks discussed:  Infection and pain  Universal protocol:     Procedure explained and questions answered to patient or proxy's satisfaction: yes      Relevant documents present and verified: yes      Test results available: yes      Imaging  studies available: yes      Required blood products, implants, devices, and special equipment available: no      Site/side marked: yes      Immediately prior to procedure, a time out was called: yes      Patient identity confirmed:  Verbally with patient  Anesthesia:     Anesthesia method:  Local infiltration    Local anesthetic:  Lidocaine 1% w/o epi  Laceration details:     Location:  Hand    Hand location:  L palm    Length (cm):  4    Depth (mm):  3  Pre-procedure details:     Preparation:  Patient was prepped and draped in usual sterile fashion and imaging obtained to evaluate for foreign bodies  Exploration:     Limited defect created (wound extended): no      Hemostasis achieved with:  Direct pressure    Imaging obtained: x-ray      Imaging outcome: foreign body not noted      Wound exploration: wound explored through full range of motion and entire depth of wound visualized      Contaminated: yes (MULTIPLE WOOD FB NOTED)    Treatment:     Area cleansed with:  Povidone-iodine and saline    Amount of cleaning:  Extensive    Irrigation solution:  Sterile saline    Irrigation volume:  250    Irrigation method:  Syringe    Visualized foreign bodies/material removed: yes      Debridement:  None    Undermining:  None    Scar revision: no    Skin repair:     Repair method:  Sutures    Suture size:  4-0    Suture material:  Nylon    Number of sutures:  7  Approximation:     Approximation:  Close  Repair type:     Repair type:  Simple  Post-procedure details:     Dressing:  Antibiotic ointment, non-adherent dressing and sterile dressing    Procedure completion:  Tolerated    Labs Reviewed - No data to display       Imaging Results              X-Ray Hand 3 view Left (Final result)  Result time 08/09/23 17:05:15      Final result by Julius Oswald DO (08/09/23 17:05:15)                   Impression:      As above.    Point of Service: [facility]      Electronically signed by: Julius  Darek  Date:    08/09/2023  Time:    17:05               Narrative:    EXAMINATION:  XR HAND COMPLETE 3 VIEW LEFT    CLINICAL HISTORY:  LACERATION L HAND;    COMPARISON:  None    TECHNIQUE:  Frontal, lateral, and oblique views of the left hand.    FINDINGS:  Multifocal scattered degenerative change including most significantly within the 2nd and 3rd as well as the 1st MCP joints and the 1st CMC and triscaphe joints.  There are also scattered interphalangeal degenerative changes noted.  No convincing acute fracture or dislocation demonstrated. No concerning radiopaque foreign body visualized.                                    X-Rays:   Independently Interpreted Readings:   Other Readings:  FINDINGS:  Multifocal scattered degenerative change including most significantly within the 2nd and 3rd as well as the 1st MCP joints and the 1st CMC and triscaphe joints.  There are also scattered interphalangeal degenerative changes noted.  No convincing acute fracture or dislocation demonstrated. No concerning radiopaque foreign body visualized.     Impression:     As above.     Point of Service: [facility]        Electronically signed by: Julius Oswald  Date:                                            08/09/2023  Time:                                           17:05    Medications   Tdap (BOOSTRIX) vaccine injection 0.5 mL (0.5 mLs Intramuscular Given 8/9/23 1658)   LIDOcaine HCL 10 mg/ml (1%) injection 10 mL (10 mLs Other Given 8/9/23 1720)   neomycin-bacitracnZn-polymyxnB packet (1 each Topical (Top) Given 8/9/23 1800)   cephALEXin capsule 500 mg (500 mg Oral Given 8/9/23 1813)     Medical Decision Making:   Initial Assessment:   PT IS A 72 YR OLD WM WITH LACERATION L PALM ON TABLE SAW 30 MIN PTA, WHILE MAKING A WOODEN TOY. PT DENIES PARESTHESIAS OR DECREASED ROM.  PT DENIES ADDITIONAL COMPLAINTS. PT TOOK TRAMADOL PTA WITH IMPROVEMENT , PAIN IS INCREASED WITH ROM AND PALPATION AND DECREASED WITH REMAINING  STATIONARY AND TRAMADOL.  TET IS NOT UTD  ON AC- LOW DOSE ASA    Differential Diagnosis:   LAC  FB OR NO FB  ABNORMAL XRAY    Clinical Tests:   Radiological Study: Ordered and Reviewed  ED Management:  EXAM  XRAY NEG  SUTURE, FB REMOVAL (WOOD)  TET BOOSTER  DC IN STABLE CONDITION  ELEVATE  WOUND CARE DAILY AND APPLY NEOSPORIN  MEDICATIONS AS DIRECTED  TYLENOL AS NEEDED FOR PAIN                           Clinical Impression:   Final diagnoses:  [G55.300P] Laceration of left hand with foreign body, initial encounter (Primary)        ED Disposition Condition    Discharge Stable          ED Prescriptions       Medication Sig Dispense Start Date End Date Auth. Provider    cephALEXin (KEFLEX) 500 MG capsule () Take 1 capsule (500 mg total) by mouth 4 (four) times daily. for 7 days 28 capsule 2023 Pat Garcia MD          Follow-up Information       Follow up With Specialties Details Why Contact Info    Moises Lei MD Family Medicine, Emergency Medicine In 2 days For wound re-check SUTURES OUT IN 14 DAYS [id number] [facility]  [facility] [id number] [phone]               Pat Garcia MD  [phone]

## 2023-08-22 ENCOUNTER — TELEPHONE (OUTPATIENT)
Dept: EMERGENCY MEDICINE | Facility: HOSPITAL | Age: 73
End: 2023-08-22
Payer: MEDICARE

## 2023-11-20 ENCOUNTER — OFFICE VISIT (OUTPATIENT)
Dept: FAMILY MEDICINE | Facility: CLINIC | Age: 73
End: 2023-11-20
Payer: MEDICARE

## 2023-11-20 VITALS
HEIGHT: 72 IN | SYSTOLIC BLOOD PRESSURE: 146 MMHG | TEMPERATURE: 98 F | WEIGHT: 222 LBS | BODY MASS INDEX: 30.07 KG/M2 | HEART RATE: 74 BPM | OXYGEN SATURATION: 99 % | DIASTOLIC BLOOD PRESSURE: 92 MMHG | RESPIRATION RATE: 16 BRPM

## 2023-11-20 DIAGNOSIS — Z12.5 SPECIAL SCREENING FOR MALIGNANT NEOPLASM OF PROSTATE: ICD-10-CM

## 2023-11-20 DIAGNOSIS — E78.5 HYPERLIPIDEMIA, UNSPECIFIED HYPERLIPIDEMIA TYPE: ICD-10-CM

## 2023-11-20 DIAGNOSIS — I10 PRIMARY HYPERTENSION: Primary | ICD-10-CM

## 2023-11-20 DIAGNOSIS — Z79.899 ENCOUNTER FOR LONG-TERM (CURRENT) USE OF OTHER MEDICATIONS: ICD-10-CM

## 2023-11-20 DIAGNOSIS — M54.9 BACK PAIN, UNSPECIFIED BACK LOCATION, UNSPECIFIED BACK PAIN LATERALITY, UNSPECIFIED CHRONICITY: ICD-10-CM

## 2023-11-20 LAB
CTP QC/QA: YES
POC (AMP) AMPHETAMINE: NEGATIVE
POC (BAR) BARBITURATES: NEGATIVE
POC (BUP) BUPRENORPHINE: NEGATIVE
POC (BZO) BENZODIAZEPINES: NEGATIVE
POC (COC) COCAINE: NEGATIVE
POC (MDMA) METHYLENEDIOXYMETHAMPHETAMINE 3,4: NEGATIVE
POC (MET) METHAMPHETAMINE: NEGATIVE
POC (MOP) OPIATES: NEGATIVE
POC (MTD) METHADONE: NEGATIVE
POC (OXY) OXYCODONE: NEGATIVE
POC (PCP) PHENCYCLIDINE: NEGATIVE
POC (TCA) TRICYCLIC ANTIDEPRESSANTS: NORMAL
POC TEMPERATURE (URINE): 92
POC THC: NEGATIVE
PSA SERPL-MCNC: 0.69 NG/ML

## 2023-11-20 PROCEDURE — G0103 PSA SCREENING: HCPCS | Mod: ,,, | Performed by: CLINICAL MEDICAL LABORATORY

## 2023-11-20 PROCEDURE — 99214 PR OFFICE/OUTPT VISIT, EST, LEVL IV, 30-39 MIN: ICD-10-PCS | Mod: ,,, | Performed by: FAMILY MEDICINE

## 2023-11-20 PROCEDURE — 99214 OFFICE O/P EST MOD 30 MIN: CPT | Mod: ,,, | Performed by: FAMILY MEDICINE

## 2023-11-20 PROCEDURE — G0103 PSA, SCREENING: ICD-10-PCS | Mod: ,,, | Performed by: CLINICAL MEDICAL LABORATORY

## 2023-11-20 PROCEDURE — 80305 DRUG TEST PRSMV DIR OPT OBS: CPT | Mod: RHCUB | Performed by: FAMILY MEDICINE

## 2023-11-20 RX ORDER — TRAMADOL HYDROCHLORIDE 50 MG/1
TABLET ORAL
Qty: 150 TABLET | Refills: 5 | Status: SHIPPED | OUTPATIENT
Start: 2023-11-20

## 2023-11-20 NOTE — PROGRESS NOTES
Moises Lei MD        PATIENT NAME: Brandt Nicole  : 1950  DATE: 23  MRN: [id number]      Billing Provider: Moises Lei MD  Level of Service: [facility] OFFICE/OUTPT VISIT, EST, LEVL IV, 30-39 MIN  Patient PCP Information       Provider PCP Type    Moises Lei MD General            Reason for Visit / Chief Complaint: Chronic Pain (3 month med check.)       Update PCP  Update Chief Complaint         History of Present Illness / Problem Focused Workflow     Brandt Nicole presents to the clinic with Chronic Pain (3 month med check.)     Routine followup.  No significant interval change      Chronic Pain  Associated symptoms: no abdominal pain, no chest pain, no constipation, no dizziness, no headaches, no weakness (global weakness) and no nausea        Review of Systems     Review of Systems   Constitutional:  Negative for activity change, appetite change, fever and unexpected weight change.   HENT:  Negative for congestion, rhinorrhea, sinus pressure, sinus pain, sore throat and trouble swallowing.    Eyes:  Negative for photophobia, pain, discharge and visual disturbance.   Respiratory:  Negative for cough, chest tightness, wheezing and stridor.    Cardiovascular:  Negative for chest pain, palpitations and leg swelling.   Gastrointestinal:  Negative for abdominal pain, blood in stool, constipation, diarrhea and nausea.   Endocrine: Negative for polydipsia, polyphagia and polyuria.   Genitourinary:  Negative for difficulty urinating, flank pain and hematuria.   Musculoskeletal:  Positive for back pain. Negative for arthralgias and neck pain.   Skin:  Negative for rash.   Allergic/Immunologic: Negative for food allergies.   Neurological:  Negative for dizziness, tremors, seizures, syncope, weakness (global weakness) and headaches.   Psychiatric/Behavioral:  Negative for behavioral problems, confusion, decreased concentration, dysphoric mood and hallucinations. The patient is not nervous/anxious.          Medical / Social / Family History     Past Medical History:   Diagnosis Date    Cancer     colon cancer.    Essential (primary) hypertension        Past Surgical History:   Procedure Laterality Date    PARTIAL HIP ARTHROPLASTY Right     TOTAL KNEE ARTHROPLASTY Bilateral        Social History    reports that he has quit smoking. His smoking use included cigarettes. He has been exposed to tobacco smoke. He has never used smokeless tobacco. He reports that he does not drink alcohol and does not use drugs.    Family History  's family history is not on file.    Medications and Allergies     Medications  No outpatient medications have been marked as taking for the 11/20/23 encounter (Office Visit) with Moises Lei MD.       Allergies  Review of patient's allergies indicates:   Allergen Reactions    Metoclopramide Other (See Comments)     Panic attack    Tamsulosin hcl Other (See Comments)     Panic attack    Levofloxacin      Anxiety attack       Physical Examination     Vitals:    11/20/23 1111   BP: (!) 146/92   Pulse: 74   Resp: 16   Temp: 97.9 °F (36.6 °C)     Physical Exam  Constitutional:       General: He is not in acute distress.     Appearance: Normal appearance.   HENT:      Head: Normocephalic.      Right Ear: Tympanic membrane and ear canal normal.      Left Ear: Tympanic membrane and ear canal normal.      Nose: Nose normal.      Mouth/Throat:      Mouth: Mucous membranes are moist.      Pharynx: No oropharyngeal exudate.   Eyes:      Extraocular Movements: Extraocular movements intact.      Pupils: Pupils are equal, round, and reactive to light.   Cardiovascular:      Rate and Rhythm: Normal rate and regular rhythm.      Heart sounds: No murmur heard.  Pulmonary:      Effort: Pulmonary effort is normal.      Breath sounds: Normal breath sounds. No wheezing.   Abdominal:      General: Abdomen is flat. Bowel sounds are normal.      Palpations: Abdomen is soft.      Hernia: No hernia is  present.   Musculoskeletal:         General: Normal range of motion.      Cervical back: Normal range of motion and neck supple.      Right lower leg: No edema.      Left lower leg: No edema.   Lymphadenopathy:      Cervical: No cervical adenopathy.   Skin:     General: Skin is warm and dry.      Coloration: Skin is not jaundiced.      Findings: No lesion.   Neurological:      General: No focal deficit present.      Mental Status: He is alert and oriented to person, place, and time.      Cranial Nerves: No cranial nerve deficit.      Gait: Gait normal.   Psychiatric:         Mood and Affect: Mood normal.         Behavior: Behavior normal.         Judgment: Judgment normal.          Assessment and Plan (including Health Maintenance)      Problem List  Smart Sets  Document Outside HM   :    Plan:     1. Encounter for long-term (current) use of other medications  The current medical regimen is effective;  continue present plan and medications.  -     POCT Urine Drug Screen Presump    2. Special screening for malignant neoplasm of prostate  The current medical regimen is effective;  continue present plan and medications.  -     PSA, Screening    3. Back pain, unspecified back location, unspecified back pain laterality, unspecified chronicity  The current medical regimen is effective;  continue present plan and medications.  -     traMADoL (ULTRAM) 50 mg tablet; 1-2 tabs po q 6 hours prn pain  Dispense: 150 tablet; Refill: 5    4. Primary hypertension  The current medical regimen is effective;  continue present plan and medications.  -     CBC Auto Differential; Future; Expected date: 05/20/2024  -     Comprehensive Metabolic Panel; Future; Expected date: 05/20/2024    5. Hyperlipidemia, unspecified hyperlipidemia type  The current medical regimen is effective;  continue present plan and medications.  -     Lipid Panel; Future; Expected date: 05/20/2024          Health Maintenance Due   Topic Date Due    Hepatitis C  Screening  Never done    Shingles Vaccine (1 of 2) Never done    RSV Vaccine (Age 60+) (1 - 1-dose 60+ series) Never done    Pneumococcal Vaccines (Age 65+) (1 - PCV) Never done    Abdominal Aortic Aneurysm Screening  Never done    COVID-19 Vaccine (3 - 2023-24 season) 09/01/2023       1. Primary hypertension  -     CBC Auto Differential; Future; Expected date: 05/20/2024  -     Comprehensive Metabolic Panel; Future; Expected date: 05/20/2024    2. Encounter for long-term (current) use of other medications  -     POCT Urine Drug Screen Presump    3. Special screening for malignant neoplasm of prostate  -     PSA, Screening    4. Back pain, unspecified back location, unspecified back pain laterality, unspecified chronicity  -     traMADoL (ULTRAM) 50 mg tablet; 1-2 tabs po q 6 hours prn pain  Dispense: 150 tablet; Refill: 5    5. Hyperlipidemia, unspecified hyperlipidemia type  -     Lipid Panel; Future; Expected date: 05/20/2024         Health Maintenance Topics with due status: Not Due       Topic Last Completion Date    Lipid Panel 02/05/2020    TETANUS VACCINE 08/09/2023       No future appointments.     There are no Patient Instructions on file for this visit.  Follow up in about 3 months (around 2/20/2024) for routine followup.     Signature:  Moises Lei MD      Date of encounter: 11/20/23

## 2023-12-09 DIAGNOSIS — Z71.89 COMPLEX CARE COORDINATION: ICD-10-CM

## 2024-02-05 ENCOUNTER — HOSPITAL ENCOUNTER (EMERGENCY)
Facility: HOSPITAL | Age: 74
Discharge: HOME | End: 2024-02-05
Attending: EMERGENCY MEDICINE
Payer: MEDICARE

## 2024-02-05 VITALS
BODY MASS INDEX: 30.48 KG/M2 | WEIGHT: 225 LBS | SYSTOLIC BLOOD PRESSURE: 166 MMHG | OXYGEN SATURATION: 97 % | DIASTOLIC BLOOD PRESSURE: 92 MMHG | HEIGHT: 72 IN | TEMPERATURE: 98 F | HEART RATE: 72 BPM | RESPIRATION RATE: 18 BRPM

## 2024-02-05 DIAGNOSIS — S61.211A LACERATION OF LEFT INDEX FINGER WITHOUT FOREIGN BODY WITHOUT DAMAGE TO NAIL, INITIAL ENCOUNTER: Primary | ICD-10-CM

## 2024-02-05 DIAGNOSIS — S61.209A EXTENSOR TENDON LACERATION OF FINGER WITH OPEN WOUND, INITIAL ENCOUNTER: ICD-10-CM

## 2024-02-05 DIAGNOSIS — S56.429A EXTENSOR TENDON LACERATION OF FINGER WITH OPEN WOUND, INITIAL ENCOUNTER: ICD-10-CM

## 2024-02-05 PROCEDURE — 25000003 PHARM REV CODE 250: Performed by: EMERGENCY MEDICINE

## 2024-02-05 PROCEDURE — 99284 EMERGENCY DEPT VISIT MOD MDM: CPT | Mod: 25

## 2024-02-05 PROCEDURE — 12002 RPR S/N/AX/GEN/TRNK2.6-7.5CM: CPT | Performed by: EMERGENCY MEDICINE

## 2024-02-05 PROCEDURE — 99284 EMERGENCY DEPT VISIT MOD MDM: CPT | Mod: 25 | Performed by: EMERGENCY MEDICINE

## 2024-02-05 PROCEDURE — 12002 RPR S/N/AX/GEN/TRNK2.6-7.5CM: CPT

## 2024-02-05 PROCEDURE — 63600175 PHARM REV CODE 636 W HCPCS: Performed by: EMERGENCY MEDICINE

## 2024-02-05 PROCEDURE — 96372 THER/PROPH/DIAG INJ SC/IM: CPT | Performed by: EMERGENCY MEDICINE

## 2024-02-05 RX ORDER — CEFAZOLIN SODIUM 1 G/3ML
1 INJECTION, POWDER, FOR SOLUTION INTRAMUSCULAR; INTRAVENOUS
Status: COMPLETED | OUTPATIENT
Start: 2024-02-05 | End: 2024-02-05

## 2024-02-05 RX ORDER — LIDOCAINE HYDROCHLORIDE 10 MG/ML
1 INJECTION INFILTRATION; PERINEURAL
Status: COMPLETED | OUTPATIENT
Start: 2024-02-05 | End: 2024-02-05

## 2024-02-05 RX ORDER — AMOXICILLIN AND CLAVULANATE POTASSIUM 875; 125 MG/1; MG/1
1 TABLET, FILM COATED ORAL 2 TIMES DAILY
Qty: 14 TABLET | Refills: 0 | Status: SHIPPED | OUTPATIENT
Start: 2024-02-05

## 2024-02-05 RX ADMIN — BACITRACIN ZINC, NEOMYCIN, POLYMYXIN B SULFAT 2 EACH: 5000; 3.5; 4 OINTMENT TOPICAL at 04:02

## 2024-02-05 RX ADMIN — CEFAZOLIN 1 G: 330 INJECTION, POWDER, FOR SOLUTION INTRAMUSCULAR; INTRAVENOUS at 03:02

## 2024-02-05 RX ADMIN — LIDOCAINE HYDROCHLORIDE 1 ML: 10 INJECTION, SOLUTION INFILTRATION; PERINEURAL at 04:02

## 2024-02-05 NOTE — ED PROVIDER NOTES
Encounter Date: 2/5/2024       History     Chief Complaint   Patient presents with    Laceration     To left 2nd digit approx. 30 mins PTA with a table saw     PT IS A 73 YR OLD WM WITH LACERATION TO THE L 2 FINGER  ON A TABLE SAW WHILE WOODWORKING 30 MIN PTA WITHOUT PARESTHESIAS OR DECREASED ROM. PT DENIES ADDITIONAL SYMPTOMS.   PT IS ON ASA 81 MG  LAST DOSE THIS AM AT 0500. LAST MEAL PTA WAS AT 1130 AM.  R HAND DOMINANT, NONSMOKER AND NONDIABETIC    Past Medical History    Diagnosis Date Comments  Essential (primary) hypertension [I10]    Cancer [C80.1]  colon cancer.              Review of patient's allergies indicates:   Allergen Reactions    Metoclopramide Other (See Comments)     Panic attack    Tamsulosin hcl Other (See Comments)     Panic attack    Levofloxacin      Anxiety attack     Past Medical History:   Diagnosis Date    Cancer     colon cancer.    Essential (primary) hypertension      Past Surgical History:   Procedure Laterality Date    PARTIAL HIP ARTHROPLASTY Right     TOTAL KNEE ARTHROPLASTY Bilateral      History reviewed. No pertinent family history.  Social History     Tobacco Use    Smoking status: Former     Types: Cigarettes     Passive exposure: Past    Smokeless tobacco: Never   Substance Use Topics    Alcohol use: Never    Drug use: Never     Review of Systems   Constitutional: Negative.    HENT: Negative.     Eyes: Negative.    Respiratory: Negative.     Cardiovascular: Negative.    Gastrointestinal: Negative.    Endocrine: Negative.    Genitourinary: Negative.    Musculoskeletal: Negative.    Skin:  Positive for wound.   Allergic/Immunologic: Negative.    Neurological: Negative.    Hematological: Negative.    Psychiatric/Behavioral: Negative.     All other systems reviewed and are negative.      Physical Exam     Initial Vitals [02/05/24 1449]   BP Pulse Resp Temp SpO2   (!) 143/83 83 18 98.1 °F (36.7 °C) 98 %      MAP       --         Physical Exam    Nursing note and vitals  reviewed.  Constitutional: He appears well-developed and well-nourished. He is cooperative.   HENT:   Head: Normocephalic and atraumatic.   Right Ear: External ear normal.   Left Ear: External ear normal.   Nose: Nose normal.   Mouth/Throat: Oropharynx is clear and moist.   Eyes: Conjunctivae and EOM are normal. Pupils are equal, round, and reactive to light.   Neck: Trachea normal. Neck supple. No thyromegaly present. No tracheal deviation present.   Normal range of motion.  Cardiovascular:  Regular rhythm, normal heart sounds and intact distal pulses.           No murmur heard.  Pulses:       Radial pulses are 3+ on the right side and 3+ on the left side.        Dorsalis pedis pulses are 3+ on the right side and 3+ on the left side.   Pulmonary/Chest: Breath sounds normal. No respiratory distress. He has no wheezes.   Abdominal: Abdomen is soft. Bowel sounds are normal. He exhibits no distension. There is no abdominal tenderness.   Musculoskeletal:         General: Tenderness (DORSUM L 2 FINGER) present. No edema. Normal range of motion.      Right shoulder: Normal.      Left shoulder: Normal.      Right upper arm: Normal.      Left upper arm: Normal.      Right elbow: Normal.      Left elbow: Normal.      Right forearm: Normal.      Left forearm: Normal.      Right wrist: Normal.      Left wrist: Normal.      Right hand: Normal.      Left hand: Normal.      Cervical back: Normal range of motion and neck supple.     Lymphadenopathy:     He has no cervical adenopathy.     He has no axillary adenopathy.   Neurological: He is alert and oriented to person, place, and time. He has normal strength and normal reflexes. No cranial nerve deficit or sensory deficit. He displays a negative Romberg sign. GCS eye subscore is 4. GCS verbal subscore is 5. GCS motor subscore is 6.   Reflex Scores:       Bicep reflexes are 2+ on the right side and 2+ on the left side.       Patellar reflexes are 2+ on the right side and 2+ on the  left side.  Skin: Skin is warm and dry. Capillary refill takes less than 2 seconds.   7 CM IRREGULAR DORSAL LACERATION OF R 2 FINGER WITH DISTAL TO PIP AND DISTAL TO MCP PARTIAL EXTENSOR TENDON LACERATIONS   Psychiatric: He has a normal mood and affect. His speech is normal and behavior is normal. Judgment and thought content normal. Cognition and memory are normal.         Medical Screening Exam   See Full Note    ED Course   Lac Repair    Date/Time: 2/5/2024 6:29 PM    Performed by: Pat Garcia MD  Authorized by: Pat Garcia MD    Consent:     Consent obtained:  Verbal    Consent given by:  Patient    Risks, benefits, and alternatives were discussed: yes      Risks discussed:  Infection, pain, need for additional repair and tendon damage  Universal protocol:     Procedure explained and questions answered to patient or proxy's satisfaction: yes      Relevant documents present and verified: yes      Test results available: yes      Imaging studies available: yes      Required blood products, implants, devices, and special equipment available: no      Site/side marked: yes      Immediately prior to procedure, a time out was called: yes      Patient identity confirmed:  Verbally with patient  Anesthesia:     Anesthesia method:  Nerve block    Block needle gauge:  25 G    Block anesthetic:  Lidocaine 1% w/o epi    Block injection procedure:  Anatomic landmarks identified, introduced needle, anatomic landmarks palpated and negative aspiration for blood    Block outcome:  Anesthesia achieved  Laceration details:     Location:  Finger    Finger location:  L index finger    Length (cm):  7    Depth (mm):  3  Pre-procedure details:     Preparation:  Patient was prepped and draped in usual sterile fashion and imaging obtained to evaluate for foreign bodies  Exploration:     Limited defect created (wound extended): no      Hemostasis achieved with:  Direct pressure    Imaging obtained: x-ray      Imaging  outcome: foreign body not noted      Wound exploration: wound explored through full range of motion and entire depth of wound visualized      Wound extent: tendon damage (EXTENSOR PARTIAL TEAR DISTAL TO MCP AND PIP)      Tendon damage location:  Lower extremity    Lower extremity tendon damage location:  R 2 FINGEREXTENSOR TENDON    Tendon damage extent:  Partial transection    Tendon repair plan:  Refer for evaluation    Contaminated: yes    Treatment:     Area cleansed with:  Povidone-iodine and saline    Amount of cleaning:  Extensive    Irrigation solution:  Sterile saline    Irrigation volume:  200    Irrigation method:  Syringe    Visualized foreign bodies/material removed: no      Debridement:  None    Undermining:  None    Scar revision: no    Skin repair:     Repair method:  Sutures    Suture size:  4-0    Suture material:  Nylon    Suture technique:  Simple interrupted    Number of sutures:  18  Approximation:     Approximation:  Close  Repair type:     Repair type:  Simple  Post-procedure details:     Dressing:  Antibiotic ointment, non-adherent dressing, sterile dressing, bulky dressing and splint for protection    Procedure completion:  Tolerated well, no immediate complications  Comments:      REFERRAL TO DR BRYN HEREDIA AND HE WILL FOLLOW UP IN THE OFFICE   PT MAY REQUIRE EXPLORATION OF WOUND, THIS WOULD BE AT DR HEREDIA'S DISCRETION    Labs Reviewed - No data to display       Imaging Results              X-Ray Hand 3 view Left (Final result)  Result time 02/05/24 15:17:56      Final result by Fidel Garcia II, MD (02/05/24 15:17:56)                   Impression:      No definite acute injury.      Electronically signed by: Fidel Garcia  Date:    02/05/2024  Time:    15:17               Narrative:    EXAMINATION:  XR HAND COMPLETE 3 VIEW LEFT    CLINICAL HISTORY:  LACERATION;    COMPARISON:  9 August 2023    TECHNIQUE:  XR HAND single AP view LEFT    FINDINGS:  No evidence of fracture seen.  The  alignment of the joints appears normal.  Moderate to severe 1st carpometacarpal joint and mild to moderate remaining joint degenerative change is present.  No soft tissue abnormality is seen.                                    X-Rays:   Independently Interpreted Readings:   Chest X-Ray: Viewed and Other Results - Imaging    Updated   Order   02/05/24 1520  X-Ray Hand 3 view Left  Performed: 02/05/24 1508  Final         Impression: No definite acute injury. Electronically signed by: Fidel Garcia Date: 02/05/2024 Time: 15:17         Other Readings:  Viewed and Other Results - Imaging    Updated   Order   02/05/24 1520  X-Ray Hand 3 view Left  Performed: 02/05/24 1508  Final         Impression: No definite acute injury. Electronically signed by: Fidel Garcia Date: 02/05/2024 Time: 15:17          Medications   LIDOcaine HCL 10 mg/ml (1%) injection 1 mL (1 mL Other Given 2/5/24 1653)   neomycin-bacitracnZn-polymyxnB packet (2 each Topical (Top) Given 2/5/24 1653)   ceFAZolin injection 1 g (1 g Intramuscular Given 2/5/24 1526)     Medical Decision Making  Viewed and Other Results - Imaging    Updated   Order   02/05/24 1520  X-Ray Hand 3 view Left  Performed: 02/05/24 1508  Final         Impression: No definite acute injury. Electronically signed by: Fidel Garcia Date: 02/05/2024 Time: 15:17          Amount and/or Complexity of Data Reviewed  Radiology: ordered.     Details: Viewed and Other Results - Imaging    Updated   Order   02/05/24 1520  X-Ray Hand 3 view Left  Performed: 02/05/24 1508  Final         Impression: No definite acute injury. Electronically signed by: Fidel Garcia Date: 02/05/2024 Time: 15:17        Discussion of management or test interpretation with external provider(s): EXAM  XRAY NEG  IM ANCEF  IRRIGATED, CLEANED WITH BETADINE, SUTURED  PARTIAL TENDON LAC  1650 CONSULT DR ERIC HEREDIA  HE WILL FOLLOW UP IN THE OFFICE    Risk  OTC drugs.  Prescription drug management.                                       Clinical Impression:   Final diagnoses:  [S61.211A] Laceration of left index finger without foreign body without damage to nail, initial encounter (Primary)  [S56.429A, S61.209A] Extensor tendon laceration of finger with open wound, initial encounter        ED Disposition Condition    Discharge Stable          ED Prescriptions       Medication Sig Dispense Start Date End Date Auth. Provider    amoxicillin-clavulanate 875-125mg (AUGMENTIN) 875-125 mg per tablet Take 1 tablet by mouth 2 (two) times daily. 14 tablet 2/5/2024 -- Pat Garcia MD          Follow-up Information       Follow up With Specialties Details Why Contact Info    Bernardino Salmeron MD Orthopedic Surgery Schedule an appointment as soon as possible for a visit in 1 day WILL CALL YOU WITH APPOINTMENT FOR THIS WEEK [address] [place] [id number]  [phone]               Pat Garcia MD  02/05/24 0961

## 2024-02-05 NOTE — DISCHARGE INSTRUCTIONS
ELEVATE  INCREASE REST AND FLUIDS  MEDICATION AS DIRECTED  OVER THE COUNTER TYLENOL  TRAMADOL FOR PAIN  NO NSAIDS (ALEVE , ADVIL )  WOUND CARE DAILY WITH DRESSING CHANGE, NEOSPORIN BULKY DRESSING  THE ORTHO CLINIC WILL CALL YOU WITH AN APPOINTMENT

## 2024-02-07 ENCOUNTER — OFFICE VISIT (OUTPATIENT)
Dept: ORTHOPEDICS | Facility: CLINIC | Age: 74
End: 2024-02-07
Payer: MEDICARE

## 2024-02-07 DIAGNOSIS — S61.211A LACERATION OF LEFT INDEX FINGER WITHOUT FOREIGN BODY WITHOUT DAMAGE TO NAIL, INITIAL ENCOUNTER: ICD-10-CM

## 2024-02-07 DIAGNOSIS — S56.429A EXTENSOR TENDON LACERATION OF FINGER WITH OPEN WOUND, INITIAL ENCOUNTER: ICD-10-CM

## 2024-02-07 DIAGNOSIS — S61.209A EXTENSOR TENDON LACERATION OF FINGER WITH OPEN WOUND, INITIAL ENCOUNTER: ICD-10-CM

## 2024-02-07 PROCEDURE — 99213 OFFICE O/P EST LOW 20 MIN: CPT | Mod: PBBFAC

## 2024-02-07 PROCEDURE — 99203 OFFICE O/P NEW LOW 30 MIN: CPT | Mod: S$PBB,,,

## 2024-02-07 NOTE — PATIENT INSTRUCTIONS
Laceration to left index finger.  Upon the time of the evaluation in the ER there was questionable injury to the extensor tendon, however in clinic today he is able to flex and extend his finger as well as hold his finger in extension without much difficulty.  Patient was given antibiotic injection and oral antibiotics by the ER.  Discussed follow up in 10-14 days With me or Dr. Salmeron, sooner PRN.

## 2024-02-07 NOTE — PROGRESS NOTES
CC: No chief complaint on file.         Brandt Nicole is a 73 y.o. male seen today for laceration to left index finger.  Patient states that on Monday afternoon he was working in his workshop with a skill saw and cut his finger.  He went to [facility] where sutures were placed to close the laceration however there was question of possible injury to the extensor tendons.  Referral was sent to [facility] for further evaluation.  Note that he is on antibiotics.      PAST MEDICAL HISTORY:   Past Medical History:   Diagnosis Date    Cancer     colon cancer.    Essential (primary) hypertension           PAST SURGICAL HISTORY:   Past Surgical History:   Procedure Laterality Date    PARTIAL HIP ARTHROPLASTY Right     TOTAL KNEE ARTHROPLASTY Bilateral           ALLERGIES:   Review of patient's allergies indicates:   Allergen Reactions    Metoclopramide Other (See Comments)     Panic attack    Tamsulosin hcl Other (See Comments)     Panic attack    Levofloxacin      Anxiety attack        MEDICATIONS :    Current Outpatient Medications:     amoxicillin-clavulanate 875-125mg (AUGMENTIN) 875-125 mg per tablet, Take 1 tablet by mouth 2 (two) times daily., Disp: 14 tablet, Rfl: 0    aspirin 81 mg Cap, Take 81 mg by mouth Daily., Disp: , Rfl:     lisinopriL 10 MG tablet, Take 1 tablet (10 mg total) by mouth once daily., Disp: 90 tablet, Rfl: 3    mupirocin (BACTROBAN) 2 % ointment, Apply topically 3 (three) times daily., Disp: 30 g, Rfl: 5    traMADoL (ULTRAM) 50 mg tablet, 1-2 tabs po q 6 hours prn pain, Disp: 150 tablet, Rfl: 5     SOCIAL HISTORY:   Social History     Socioeconomic History    Marital status:    Tobacco Use    Smoking status: Former     Types: Cigarettes     Passive exposure: Past    Smokeless tobacco: Never   Substance and Sexual Activity    Alcohol use: Never    Drug use: Never    Sexual activity: Yes        FAMILY HISTORY: History reviewed. No pertinent family history.       PHYSICAL  EXAM:      There were no vitals filed for this visit.  There is no height or weight on file to calculate BMI.    GENERAL: Well-developed, well-nourished male . The patient is alert, oriented and cooperative.    HEENT:  Normocephalic, atraumatic.  Extraocular movements are intact bilaterally.     NECK:  Nontender with good range of motion.    LUNGS:  Clear to auscultation bilaterally.    HEART:  Regular rate and rhythm.     ABDOMEN:  Soft, non-tender, non-distended.      EXTREMITIES:  Left index finger laceration with sutures in place, notable soft tissue swelling and erythema around the incision site, he is able to flex and extend his finger however it is limited due to soft tissue swelling, he is able to hold his finger in extension, neurovascularly intact      RADIOGRAPHIC FINDINGS:        Patient Active Problem List    Diagnosis Date Noted    Colon cancer screening 01/09/2023    Back pain 11/29/2021     IMPRESSION AND PLAN:  Laceration to left index finger.  Upon the time of the evaluation in the ER there was questionable injury to the extensor tendon, however in clinic today he is able to flex and extend his finger as well as hold his finger in extension without much difficulty.  Patient was given antibiotic injection and oral antibiotics by the ER.  Discussed follow up in 10-14 days With me or Dr. Salmeron, rai PRN.      No follow-ups on file.       Heena Lovell PA-C      (Subject to voice recognition error, transcription service not allowed)

## 2024-05-29 ENCOUNTER — OFFICE VISIT (OUTPATIENT)
Dept: FAMILY MEDICINE | Facility: CLINIC | Age: 74
End: 2024-05-29
Payer: MEDICARE

## 2024-05-29 VITALS
WEIGHT: 233 LBS | HEART RATE: 74 BPM | BODY MASS INDEX: 31.56 KG/M2 | HEIGHT: 72 IN | OXYGEN SATURATION: 95 % | RESPIRATION RATE: 20 BRPM | DIASTOLIC BLOOD PRESSURE: 76 MMHG | SYSTOLIC BLOOD PRESSURE: 136 MMHG

## 2024-05-29 DIAGNOSIS — L97.909 CHRONIC VENOUS HYPERTENSION W ULCERATION: ICD-10-CM

## 2024-05-29 DIAGNOSIS — I83.018 VARICOSE VEINS OF RIGHT LOWER EXTREMITY WITH ULCER OTHER PART OF LOWER LEG (CODE): ICD-10-CM

## 2024-05-29 DIAGNOSIS — I87.319 CHRONIC VENOUS HYPERTENSION W ULCERATION: ICD-10-CM

## 2024-05-29 DIAGNOSIS — Z93.9 ARTIFICIAL OPENING STATUS, UNSPECIFIED: ICD-10-CM

## 2024-05-29 DIAGNOSIS — M54.9 BACK PAIN, UNSPECIFIED BACK LOCATION, UNSPECIFIED BACK PAIN LATERALITY, UNSPECIFIED CHRONICITY: Primary | ICD-10-CM

## 2024-05-29 DIAGNOSIS — Z79.899 ENCOUNTER FOR LONG-TERM (CURRENT) USE OF OTHER MEDICATIONS: ICD-10-CM

## 2024-05-29 PROCEDURE — 99213 OFFICE O/P EST LOW 20 MIN: CPT | Mod: ,,, | Performed by: FAMILY MEDICINE

## 2024-05-29 PROCEDURE — 80305 DRUG TEST PRSMV DIR OPT OBS: CPT | Mod: RHCUB | Performed by: FAMILY MEDICINE

## 2024-05-29 RX ORDER — TRAMADOL HYDROCHLORIDE 50 MG/1
TABLET ORAL
Qty: 150 TABLET | Refills: 5 | Status: SHIPPED | OUTPATIENT
Start: 2024-05-29 | End: 2024-05-29

## 2024-05-29 RX ORDER — TRAMADOL HYDROCHLORIDE 50 MG/1
TABLET ORAL
Qty: 150 TABLET | Refills: 5 | Status: SHIPPED | OUTPATIENT
Start: 2024-05-29

## 2024-05-29 NOTE — PROGRESS NOTES
Moises Lei MD        PATIENT NAME: Brandt Nicole  : 1950  DATE: 24  MRN: [id number]      Billing Provider: Moises Lei MD  Level of Service: [place] OFFICE/OUTPT VISIT, EST, LEVL III, 20-29 MIN  Patient PCP Information       Provider PCP Type    Moises Lei MD General            Reason for Visit / Chief Complaint: Back Pain (Check up for refill)       Update PCP  Update Chief Complaint         History of Present Illness / Problem Focused Workflow     Brandt Nicole presents to the clinic with Back Pain (Check up for refill)     Routine followup.  No significant interval change    Back Pain  Pertinent negatives include no abdominal pain, chest pain, fever, headaches or weakness (global weakness).       Review of Systems     Review of Systems   Constitutional:  Negative for activity change, appetite change, fever and unexpected weight change.   HENT:  Negative for congestion, rhinorrhea, sinus pressure, sinus pain, sore throat and trouble swallowing.    Eyes:  Negative for photophobia, pain, discharge and visual disturbance.   Respiratory:  Negative for cough, chest tightness, wheezing and stridor.    Cardiovascular:  Negative for chest pain, palpitations and leg swelling.   Gastrointestinal:  Negative for abdominal pain, blood in stool, constipation, diarrhea and nausea.   Endocrine: Negative for polydipsia, polyphagia and polyuria.   Genitourinary:  Negative for difficulty urinating, flank pain and hematuria.   Musculoskeletal:  Positive for back pain. Negative for arthralgias and neck pain.   Skin:  Negative for rash.   Allergic/Immunologic: Negative for food allergies.   Neurological:  Negative for dizziness, tremors, seizures, syncope, weakness (global weakness) and headaches.   Psychiatric/Behavioral:  Negative for behavioral problems, confusion, decreased concentration, dysphoric mood and hallucinations. The patient is not nervous/anxious.         Medical / Social / Family History      Past Medical History:   Diagnosis Date    Cancer     colon cancer.    Essential (primary) hypertension        Past Surgical History:   Procedure Laterality Date    PARTIAL HIP ARTHROPLASTY Right     TOTAL KNEE ARTHROPLASTY Bilateral        Social History    reports that he has quit smoking. His smoking use included cigarettes. He has been exposed to tobacco smoke. He has never used smokeless tobacco. He reports that he does not drink alcohol and does not use drugs.    Family History  's family history is not on file.    Medications and Allergies     Medications  No outpatient medications have been marked as taking for the 5/29/24 encounter (Office Visit) with Moises Lei MD.       Allergies  Review of patient's allergies indicates:   Allergen Reactions    Metoclopramide Other (See Comments)     Panic attack    Tamsulosin hcl Other (See Comments)     Panic attack    Levofloxacin      Anxiety attack       Physical Examination     Vitals:    05/29/24 0812   BP: (!) 169/90   Pulse: 74   Resp: 20     Physical Exam  Constitutional:       General: He is not in acute distress.     Appearance: Normal appearance.   HENT:      Head: Normocephalic.      Right Ear: Tympanic membrane and ear canal normal.      Left Ear: Tympanic membrane and ear canal normal.      Nose: Nose normal.      Mouth/Throat:      Mouth: Mucous membranes are moist.      Pharynx: No oropharyngeal exudate.   Eyes:      Extraocular Movements: Extraocular movements intact.      Pupils: Pupils are equal, round, and reactive to light.   Cardiovascular:      Rate and Rhythm: Normal rate and regular rhythm.      Heart sounds: No murmur heard.  Pulmonary:      Effort: Pulmonary effort is normal.      Breath sounds: Normal breath sounds. No wheezing.   Abdominal:      General: Abdomen is flat. Bowel sounds are normal.      Palpations: Abdomen is soft.      Hernia: No hernia is present.   Musculoskeletal:         General: Normal range of motion.       Cervical back: Normal range of motion and neck supple.      Right lower leg: No edema.      Left lower leg: No edema.   Lymphadenopathy:      Cervical: No cervical adenopathy.   Skin:     General: Skin is warm and dry.      Coloration: Skin is not jaundiced.      Findings: No lesion.   Neurological:      General: No focal deficit present.      Mental Status: He is alert and oriented to person, place, and time.      Cranial Nerves: No cranial nerve deficit.      Gait: Gait normal.   Psychiatric:         Mood and Affect: Mood normal.         Behavior: Behavior normal.         Judgment: Judgment normal.          Assessment and Plan (including Health Maintenance)      Problem List  Smart Sets  Document Outside HM   :    Plan:     1. Encounter for long-term (current) use of other medications  The current medical regimen is effective;  continue present plan and medications.  -     POCT Urine Drug Screen Presump    2. Back pain, unspecified back location, unspecified back pain laterality, unspecified chronicity  The current medical regimen is effective;  continue present plan and medications.    3. Varicose veins of right lower extremity with ulcer other part of lower leg (CODE)      4. Artificial opening status, unspecified      5. Chronic venous hypertension w ulceration            Health Maintenance Due   Topic Date Due    Hepatitis C Screening  Never done    Shingles Vaccine (1 of 2) Never done    RSV Vaccine (Age 60+ and Pregnant patients) (1 - 1-dose 60+ series) Never done    Pneumococcal Vaccines (Age 65+) (1 of 1 - PCV) Never done    Abdominal Aortic Aneurysm Screening  Never done    COVID-19 Vaccine (3 - 2023-24 season) 09/01/2023       1. Back pain, unspecified back location, unspecified back pain laterality, unspecified chronicity    2. Encounter for long-term (current) use of other medications  -     POCT Urine Drug Screen Presump    3. Varicose veins of right lower extremity with ulcer other part of lower leg  (CODE)    4. Artificial opening status, unspecified    5. Chronic venous hypertension w ulceration         Health Maintenance Topics with due status: Not Due       Topic Last Completion Date    Lipid Panel 02/05/2020    TETANUS VACCINE 08/09/2023       No future appointments.     There are no Patient Instructions on file for this visit.  Follow up in about 6 months (around 11/29/2024) for routine followup.     Signature:  Moises Lei MD      Date of encounter: 5/29/24

## 2024-07-09 DIAGNOSIS — Z71.89 COMPLEX CARE COORDINATION: ICD-10-CM

## 2024-08-13 DIAGNOSIS — I10 PRIMARY HYPERTENSION: ICD-10-CM

## 2024-08-13 RX ORDER — LISINOPRIL 10 MG/1
10 TABLET ORAL DAILY
Qty: 90 TABLET | Refills: 3 | Status: SHIPPED | OUTPATIENT
Start: 2024-08-13 | End: 2025-08-13

## 2024-11-13 ENCOUNTER — OFFICE VISIT (OUTPATIENT)
Dept: FAMILY MEDICINE | Facility: CLINIC | Age: 74
End: 2024-11-13
Payer: MEDICARE

## 2024-11-13 VITALS
DIASTOLIC BLOOD PRESSURE: 79 MMHG | TEMPERATURE: 97 F | OXYGEN SATURATION: 96 % | BODY MASS INDEX: 30.34 KG/M2 | HEART RATE: 68 BPM | SYSTOLIC BLOOD PRESSURE: 159 MMHG | HEIGHT: 72 IN | RESPIRATION RATE: 18 BRPM | WEIGHT: 224 LBS

## 2024-11-13 DIAGNOSIS — I10 ESSENTIAL (PRIMARY) HYPERTENSION: Primary | Chronic | ICD-10-CM

## 2024-11-13 DIAGNOSIS — M54.9 BACK PAIN, UNSPECIFIED BACK LOCATION, UNSPECIFIED BACK PAIN LATERALITY, UNSPECIFIED CHRONICITY: ICD-10-CM

## 2024-11-13 DIAGNOSIS — Z79.899 ENCOUNTER FOR LONG-TERM (CURRENT) DRUG USE: ICD-10-CM

## 2024-11-13 PROCEDURE — 99214 OFFICE O/P EST MOD 30 MIN: CPT | Mod: ,,, | Performed by: FAMILY MEDICINE

## 2024-11-13 RX ORDER — TRAMADOL HYDROCHLORIDE 50 MG/1
TABLET ORAL
Qty: 150 TABLET | Refills: 5 | Status: SHIPPED | OUTPATIENT
Start: 2024-11-13

## 2024-11-13 NOTE — PROGRESS NOTES
Moises Lei MD        PATIENT NAME: Brandt Nicole  : 1950  DATE: 24  MRN: [id number]      Billing Provider: Moises Lei MD  Level of Service: [place] OFFICE/OUTPT VISIT, EST, LEVL IV, 30-39 MIN  Patient PCP Information       Provider PCP Type    Moises Lei MD General            Reason for Visit / Chief Complaint: Back Pain (Check up for refill)       Update PCP  Update Chief Complaint         History of Present Illness / Problem Focused Workflow     Brandt Nicole presents to the clinic with Back Pain (Check up for refill)     Routine followup.  No significant interval change      Back Pain  Pertinent negatives include no abdominal pain, chest pain, fever, headaches or weakness (global weakness).       Review of Systems     Review of Systems   Constitutional:  Negative for activity change, appetite change, fever and unexpected weight change.   HENT:  Negative for congestion, rhinorrhea, sinus pressure, sinus pain, sore throat and trouble swallowing.    Eyes:  Negative for photophobia, pain, discharge and visual disturbance.   Respiratory:  Negative for cough, chest tightness, wheezing and stridor.    Cardiovascular:  Negative for chest pain, palpitations and leg swelling.   Gastrointestinal:  Negative for abdominal pain, blood in stool, constipation, diarrhea and nausea.   Endocrine: Negative for polydipsia, polyphagia and polyuria.   Genitourinary:  Negative for difficulty urinating, flank pain and hematuria.   Musculoskeletal:  Positive for back pain. Negative for arthralgias and neck pain.   Skin:  Negative for rash.   Allergic/Immunologic: Negative for food allergies.   Neurological:  Negative for dizziness, tremors, seizures, syncope, weakness (global weakness) and headaches.   Psychiatric/Behavioral:  Negative for behavioral problems, confusion, decreased concentration, dysphoric mood and hallucinations. The patient is not nervous/anxious.         Medical / Social / Family History      Past Medical History:   Diagnosis Date    Cancer     colon cancer.    Essential (primary) hypertension        Past Surgical History:   Procedure Laterality Date    PARTIAL HIP ARTHROPLASTY Right     TOTAL KNEE ARTHROPLASTY Bilateral        Social History    reports that he has quit smoking. His smoking use included cigarettes. He has been exposed to tobacco smoke. He has never used smokeless tobacco. He reports that he does not drink alcohol and does not use drugs.    Family History  's family history is not on file.    Medications and Allergies     Medications  No outpatient medications have been marked as taking for the 11/13/24 encounter (Office Visit) with Moises Lei MD.       Allergies  Review of patient's allergies indicates:   Allergen Reactions    Metoclopramide Other (See Comments)     Panic attack    Tamsulosin hcl Other (See Comments)     Panic attack    Levofloxacin      Anxiety attack       Physical Examination     Vitals:    11/13/24 0736   BP: (!) 159/79   Pulse: 68   Resp: 18   Temp: 97.4 °F (36.3 °C)     Physical Exam  Constitutional:       General: He is not in acute distress.     Appearance: Normal appearance.   HENT:      Head: Normocephalic.      Right Ear: Tympanic membrane and ear canal normal.      Left Ear: Tympanic membrane and ear canal normal.      Nose: Nose normal.      Mouth/Throat:      Mouth: Mucous membranes are moist.      Pharynx: No oropharyngeal exudate.   Eyes:      Extraocular Movements: Extraocular movements intact.      Pupils: Pupils are equal, round, and reactive to light.   Cardiovascular:      Rate and Rhythm: Normal rate and regular rhythm.      Heart sounds: No murmur heard.  Pulmonary:      Effort: Pulmonary effort is normal.      Breath sounds: Normal breath sounds. No wheezing.   Abdominal:      General: Abdomen is flat. Bowel sounds are normal.      Palpations: Abdomen is soft.      Hernia: No hernia is present.   Musculoskeletal:         General:  Normal range of motion.      Cervical back: Normal range of motion and neck supple.      Right lower leg: No edema.      Left lower leg: No edema.   Lymphadenopathy:      Cervical: No cervical adenopathy.   Skin:     General: Skin is warm and dry.      Coloration: Skin is not jaundiced.      Findings: No lesion.   Neurological:      General: No focal deficit present.      Mental Status: He is alert and oriented to person, place, and time.      Cranial Nerves: No cranial nerve deficit.      Gait: Gait normal.   Psychiatric:         Mood and Affect: Mood normal.         Behavior: Behavior normal.         Judgment: Judgment normal.          Assessment and Plan (including Health Maintenance)      Problem List  Smart Sets  Document Outside HM   :    Plan:     1. Encounter for long-term (current) drug use  The current medical regimen is effective;  continue present plan and medications.  -     POCT Urine Drug Screen Presump    2. Back pain, unspecified back location, unspecified back pain laterality, unspecified chronicity  The current medical regimen is effective;  continue present plan and medications.  -     traMADoL (ULTRAM) 50 mg tablet; 1-2 tabs po q 6 hours prn pain  Dispense: 150 tablet; Refill: 5          Health Maintenance Due   Topic Date Due    Hepatitis C Screening  Never done    Shingles Vaccine (1 of 2) Never done    RSV Vaccine (Age 60+ and Pregnant patients) (1 - Risk 60-74 years 1-dose series) Never done    Pneumococcal Vaccines (Age 65+) (1 of 1 - PCV) Never done    Abdominal Aortic Aneurysm Screening  Never done    COVID-19 Vaccine (3 - 2024-25 season) 09/01/2024       1. Essential (primary) hypertension    2. Back pain, unspecified back location, unspecified back pain laterality, unspecified chronicity  -     traMADoL (ULTRAM) 50 mg tablet; 1-2 tabs po q 6 hours prn pain  Dispense: 150 tablet; Refill: 5    3. Encounter for long-term (current) drug use  -     POCT Urine Drug Screen Presump          Health Maintenance Topics with due status: Not Due       Topic Last Completion Date    TETANUS VACCINE 08/09/2023    Lipid Panel 11/11/2024       Future Appointments   Date Time Provider Department Center   5/14/2025  7:30 AM Moises Lei MD [facility]        There are no Patient Instructions on file for this visit.  Follow up in about 6 months (around 5/13/2025) for routine followup.     Signature:  Moises Lei MD      Date of encounter: 11/13/24

## 2025-01-07 ENCOUNTER — OFFICE VISIT (OUTPATIENT)
Dept: FAMILY MEDICINE | Facility: CLINIC | Age: 75
End: 2025-01-07
Payer: MEDICARE

## 2025-01-07 VITALS
OXYGEN SATURATION: 99 % | HEART RATE: 70 BPM | DIASTOLIC BLOOD PRESSURE: 86 MMHG | RESPIRATION RATE: 16 BRPM | BODY MASS INDEX: 30.61 KG/M2 | SYSTOLIC BLOOD PRESSURE: 160 MMHG | TEMPERATURE: 99 F | HEIGHT: 72 IN | WEIGHT: 226 LBS

## 2025-01-07 DIAGNOSIS — I87.319 CHRONIC VENOUS HYPERTENSION W ULCERATION: ICD-10-CM

## 2025-01-07 DIAGNOSIS — R30.0 DYSURIA: Primary | ICD-10-CM

## 2025-01-07 DIAGNOSIS — L97.909 CHRONIC VENOUS HYPERTENSION W ULCERATION: ICD-10-CM

## 2025-01-07 DIAGNOSIS — N40.1 BENIGN PROSTATIC HYPERPLASIA WITH INCOMPLETE BLADDER EMPTYING: ICD-10-CM

## 2025-01-07 DIAGNOSIS — R39.14 BENIGN PROSTATIC HYPERPLASIA WITH INCOMPLETE BLADDER EMPTYING: ICD-10-CM

## 2025-01-07 DIAGNOSIS — N30.00 ACUTE CYSTITIS WITHOUT HEMATURIA: ICD-10-CM

## 2025-01-07 DIAGNOSIS — Z93.9 ARTIFICIAL OPENING STATUS, UNSPECIFIED: ICD-10-CM

## 2025-01-07 LAB
BILIRUB UR QL STRIP: NEGATIVE
CLARITY UR: CLEAR
COLOR UR: COLORLESS
GLUCOSE UR STRIP-MCNC: NORMAL MG/DL
KETONES UR STRIP-SCNC: NEGATIVE MG/DL
LEUKOCYTE ESTERASE UR QL STRIP: NEGATIVE
NITRITE UR QL STRIP: NEGATIVE
PH UR STRIP: 5.5 PH UNITS
PROT UR QL STRIP: NEGATIVE
RBC # UR STRIP: NEGATIVE /UL
SP GR UR STRIP: 1.01
UROBILINOGEN UR STRIP-ACNC: NORMAL MG/DL

## 2025-01-07 PROCEDURE — 81003 URINALYSIS AUTO W/O SCOPE: CPT | Mod: QW,,, | Performed by: CLINICAL MEDICAL LABORATORY

## 2025-01-07 PROCEDURE — 99213 OFFICE O/P EST LOW 20 MIN: CPT | Mod: ,,, | Performed by: FAMILY MEDICINE

## 2025-01-07 RX ORDER — SULFAMETHOXAZOLE AND TRIMETHOPRIM 800; 160 MG/1; MG/1
1 TABLET ORAL 2 TIMES DAILY
Qty: 30 TABLET | Refills: 0 | Status: SHIPPED | OUTPATIENT
Start: 2025-01-07

## 2025-01-07 RX ORDER — SILODOSIN 8 MG/1
8 CAPSULE ORAL DAILY
Qty: 30 CAPSULE | Refills: 11 | Status: SHIPPED | OUTPATIENT
Start: 2025-01-07 | End: 2026-01-07

## 2025-01-07 NOTE — PROGRESS NOTES
Moises Lei MD        PATIENT NAME: Brandt Nicole  : 1950  DATE: 25  MRN: [id number]      Billing Provider: Moises Lei MD  Level of Service: [place] OFFICE/OUTPT VISIT, EST, LEVL III, 20-29 MIN  Patient PCP Information       Provider PCP Type    Moises Lei MD General            Reason for Visit / Chief Complaint: Dysuria       Update PCP  Update Chief Complaint         History of Present Illness / Problem Focused Workflow     Brandt Nicole presents to the clinic with Dysuria     Nocturia q 1 hour for several days.      Dysuria   Pertinent negatives include no flank pain, hematuria, nausea, constipation or rash.     Review of Systems     Review of Systems   Constitutional:  Negative for activity change, appetite change, fever and unexpected weight change.   HENT:  Negative for congestion, rhinorrhea, sinus pressure, sinus pain, sore throat and trouble swallowing.    Eyes:  Negative for photophobia, pain, discharge and visual disturbance.   Respiratory:  Negative for cough, chest tightness, wheezing and stridor.    Cardiovascular:  Negative for chest pain, palpitations and leg swelling.   Gastrointestinal:  Negative for abdominal pain, blood in stool, constipation, diarrhea and nausea.   Endocrine: Negative for polydipsia, polyphagia and polyuria.   Genitourinary:  Positive for dysuria. Negative for difficulty urinating, flank pain and hematuria.   Musculoskeletal:  Negative for arthralgias and neck pain.   Skin:  Negative for rash.   Allergic/Immunologic: Negative for food allergies.   Neurological:  Negative for dizziness, tremors, seizures, syncope, weakness (global weakness) and headaches.   Psychiatric/Behavioral:  Negative for behavioral problems, confusion, decreased concentration, dysphoric mood and hallucinations. The patient is not nervous/anxious.         Medical / Social / Family History     Past Medical History:   Diagnosis Date    Cancer     colon cancer.    Essential (primary)  hypertension        Past Surgical History:   Procedure Laterality Date    PARTIAL HIP ARTHROPLASTY Right     TOTAL KNEE ARTHROPLASTY Bilateral        Social History    reports that he has quit smoking. His smoking use included cigarettes. He has been exposed to tobacco smoke. He has never used smokeless tobacco. He reports that he does not drink alcohol and does not use drugs.    Family History  's family history is not on file.    Medications and Allergies     Medications  No outpatient medications have been marked as taking for the 1/7/25 encounter (Office Visit) with Moises Lei MD.       Allergies  Review of patient's allergies indicates:   Allergen Reactions    Metoclopramide Other (See Comments)     Panic attack    Tamsulosin hcl Other (See Comments)     Panic attack    Levofloxacin      Anxiety attack       Physical Examination     Vitals:    01/07/25 0932   BP: (!) 160/86   Pulse: 70   Resp: 16   Temp: 98.6 °F (37 °C)     Physical Exam  Constitutional:       General: He is not in acute distress.     Appearance: Normal appearance.   HENT:      Head: Normocephalic.      Right Ear: Tympanic membrane and ear canal normal.      Left Ear: Tympanic membrane and ear canal normal.      Nose: Nose normal.      Mouth/Throat:      Mouth: Mucous membranes are moist.      Pharynx: No oropharyngeal exudate.   Eyes:      Extraocular Movements: Extraocular movements intact.      Pupils: Pupils are equal, round, and reactive to light.   Cardiovascular:      Rate and Rhythm: Normal rate and regular rhythm.      Heart sounds: No murmur heard.  Pulmonary:      Effort: Pulmonary effort is normal.      Breath sounds: Normal breath sounds. No wheezing.   Abdominal:      General: Abdomen is flat. Bowel sounds are normal.      Palpations: Abdomen is soft.      Hernia: No hernia is present.   Musculoskeletal:         General: Normal range of motion.      Cervical back: Normal range of motion and neck supple.      Right  lower leg: No edema.      Left lower leg: No edema.   Lymphadenopathy:      Cervical: No cervical adenopathy.   Skin:     General: Skin is warm and dry.      Coloration: Skin is not jaundiced.      Findings: No lesion.   Neurological:      General: No focal deficit present.      Mental Status: He is alert and oriented to person, place, and time.      Cranial Nerves: No cranial nerve deficit.      Gait: Gait normal.   Psychiatric:         Mood and Affect: Mood normal.         Behavior: Behavior normal.         Judgment: Judgment normal.          Assessment and Plan (including Health Maintenance)      Problem List  Smart Sets  Document Outside HM   :    Plan:     1. Chronic venous hypertension w ulceration      2. Artificial opening status, unspecified            Health Maintenance Due   Topic Date Due    Hepatitis C Screening  Never done    Shingles Vaccine (1 of 2) Never done    Pneumococcal Vaccines (Age 50+) (1 of 1 - PCV) Never done    RSV Vaccine (Age 60+ and Pregnant patients) (1 - Risk 60-74 years 1-dose series) Never done    Abdominal Aortic Aneurysm Screening  Never done    COVID-19 Vaccine (3 - 2024-25 season) 09/01/2024       1. Dysuria  -     Urinalysis, Reflex to Urine Culture    2. Chronic venous hypertension w ulceration    3. Artificial opening status, unspecified    4. Acute cystitis without hematuria  -     sulfamethoxazole-trimethoprim 800-160mg (BACTRIM DS) 800-160 mg Tab; Take 1 tablet by mouth 2 (two) times daily.  Dispense: 30 tablet; Refill: 0    5. Benign prostatic hyperplasia with incomplete bladder emptying  -     silodosin (RAPAFLO) 8 mg Cap capsule; Take 1 capsule (8 mg total) by mouth once daily.  Dispense: 30 capsule; Refill: 11         Health Maintenance Topics with due status: Not Due       Topic Last Completion Date    TETANUS VACCINE 08/09/2023    Lipid Panel 11/11/2024       Future Appointments   Date Time Provider Department Center   5/14/2025  7:30 AM Moises Lei MD [facility]        There are no Patient Instructions on file for this visit.  Follow up if symptoms worsen or fail to improve.     Signature:  Moises Lei MD      Date of encounter: 1/7/25

## 2025-03-31 ENCOUNTER — OFFICE VISIT (OUTPATIENT)
Dept: FAMILY MEDICINE | Facility: CLINIC | Age: 75
End: 2025-03-31
Payer: MEDICARE

## 2025-03-31 VITALS
RESPIRATION RATE: 16 BRPM | WEIGHT: 230 LBS | DIASTOLIC BLOOD PRESSURE: 82 MMHG | HEIGHT: 72 IN | BODY MASS INDEX: 31.15 KG/M2 | OXYGEN SATURATION: 99 % | SYSTOLIC BLOOD PRESSURE: 160 MMHG | HEART RATE: 64 BPM

## 2025-03-31 DIAGNOSIS — N40.1 BENIGN PROSTATIC HYPERPLASIA WITH INCOMPLETE BLADDER EMPTYING: Primary | ICD-10-CM

## 2025-03-31 DIAGNOSIS — R39.14 BENIGN PROSTATIC HYPERPLASIA WITH INCOMPLETE BLADDER EMPTYING: Primary | ICD-10-CM

## 2025-03-31 DIAGNOSIS — Z12.5 ENCOUNTER FOR SCREENING FOR MALIGNANT NEOPLASM OF PROSTATE: ICD-10-CM

## 2025-03-31 DIAGNOSIS — N41.1 CHRONIC PROSTATITIS: ICD-10-CM

## 2025-03-31 LAB
BILIRUB UR QL STRIP: NEGATIVE
CLARITY UR: CLEAR
COLOR UR: COLORLESS
GLUCOSE UR STRIP-MCNC: NORMAL MG/DL
KETONES UR STRIP-SCNC: NEGATIVE MG/DL
LEUKOCYTE ESTERASE UR QL STRIP: NEGATIVE
NITRITE UR QL STRIP: NEGATIVE
PH UR STRIP: 5 PH UNITS
PROT UR QL STRIP: NEGATIVE
PSA SERPL-MCNC: 0.38 NG/ML
RBC # UR STRIP: NEGATIVE /UL
SP GR UR STRIP: 1.01
UROBILINOGEN UR STRIP-ACNC: NORMAL MG/DL

## 2025-03-31 PROCEDURE — 99213 OFFICE O/P EST LOW 20 MIN: CPT | Mod: ,,, | Performed by: FAMILY MEDICINE

## 2025-03-31 PROCEDURE — G0103 PSA SCREENING: HCPCS | Mod: ,,, | Performed by: CLINICAL MEDICAL LABORATORY

## 2025-03-31 PROCEDURE — 81003 URINALYSIS AUTO W/O SCOPE: CPT | Mod: QW,,, | Performed by: CLINICAL MEDICAL LABORATORY

## 2025-03-31 RX ORDER — DOXYCYCLINE 100 MG/1
100 CAPSULE ORAL EVERY 12 HOURS
Qty: 60 CAPSULE | Refills: 0 | Status: SHIPPED | OUTPATIENT
Start: 2025-03-31

## 2025-03-31 NOTE — PROGRESS NOTES
Moises Lei MD        PATIENT NAME: Brandt Nicole  : 1950  DATE: 3/31/25  MRN: [id number]      Billing Provider: Moises Lei MD  Level of Service: [place] OFFICE/OUTPT VISIT, EST, LEVL III, 20-29 MIN  Patient PCP Information       Provider PCP Type    Moises Lei MD General            Reason for Visit / Chief Complaint: Prostate Problem       Update PCP  Update Chief Complaint         History of Present Illness / Problem Focused Workflow     Brandt Nicole presents to the clinic with Prostate Problem     Polyuria q 1 hr during day and dysuria/nocturia at night.        Review of Systems     Review of Systems   Constitutional:  Negative for activity change, appetite change, fever and unexpected weight change.   HENT:  Negative for congestion, rhinorrhea, sinus pressure, sinus pain, sore throat and trouble swallowing.    Eyes:  Negative for photophobia, pain, discharge and visual disturbance.   Respiratory:  Negative for cough, chest tightness, wheezing and stridor.    Cardiovascular:  Negative for chest pain, palpitations and leg swelling.   Gastrointestinal:  Negative for abdominal pain, blood in stool, constipation, diarrhea and nausea.   Endocrine: Negative for polydipsia, polyphagia and polyuria.   Genitourinary:  Positive for difficulty urinating and urgency. Negative for flank pain and hematuria.   Musculoskeletal:  Negative for arthralgias and neck pain.   Skin:  Negative for rash.   Allergic/Immunologic: Negative for food allergies.   Neurological:  Negative for dizziness, tremors, seizures, syncope, weakness (global weakness) and headaches.   Psychiatric/Behavioral:  Negative for behavioral problems, confusion, decreased concentration, dysphoric mood and hallucinations. The patient is not nervous/anxious.         Medical / Social / Family History     Past Medical History:   Diagnosis Date    Cancer     colon cancer.    Essential (primary) hypertension        Past Surgical History:    Procedure Laterality Date    PARTIAL HIP ARTHROPLASTY Right     TOTAL KNEE ARTHROPLASTY Bilateral        Social History    reports that he has quit smoking. His smoking use included cigarettes. He has been exposed to tobacco smoke. He has never used smokeless tobacco. He reports that he does not drink alcohol and does not use drugs.    Family History  's family history is not on file.    Medications and Allergies     Medications  No outpatient medications have been marked as taking for the 3/31/25 encounter (Office Visit) with Moises Lei MD.       Allergies  Review of patient's allergies indicates:   Allergen Reactions    Metoclopramide Other (See Comments)     Panic attack    Tamsulosin hcl Other (See Comments)     Panic attack    Levofloxacin      Anxiety attack       Physical Examination     Vitals:    03/31/25 1536   BP: (!) 160/82   Pulse: 64   Resp: 16     Physical Exam  Constitutional:       General: He is not in acute distress.     Appearance: Normal appearance.   HENT:      Head: Normocephalic.      Right Ear: Tympanic membrane and ear canal normal.      Left Ear: Tympanic membrane and ear canal normal.      Nose: Nose normal.      Mouth/Throat:      Mouth: Mucous membranes are moist.      Pharynx: No oropharyngeal exudate.   Eyes:      Extraocular Movements: Extraocular movements intact.      Pupils: Pupils are equal, round, and reactive to light.   Cardiovascular:      Rate and Rhythm: Normal rate and regular rhythm.      Heart sounds: No murmur heard.  Pulmonary:      Effort: Pulmonary effort is normal.      Breath sounds: Normal breath sounds. No wheezing.   Abdominal:      General: Abdomen is flat. Bowel sounds are normal.      Palpations: Abdomen is soft.      Hernia: No hernia is present.   Musculoskeletal:         General: Normal range of motion.      Cervical back: Normal range of motion and neck supple.      Right lower leg: No edema.      Left lower leg: No edema.    Lymphadenopathy:      Cervical: No cervical adenopathy.   Skin:     General: Skin is warm and dry.      Coloration: Skin is not jaundiced.      Findings: No lesion.   Neurological:      General: No focal deficit present.      Mental Status: He is alert and oriented to person, place, and time.      Cranial Nerves: No cranial nerve deficit.      Gait: Gait normal.   Psychiatric:         Mood and Affect: Mood normal.         Behavior: Behavior normal.         Judgment: Judgment normal.          Assessment and Plan (including Health Maintenance)      Problem List  Smart Sets  Document Outside HM   :    Plan:           Health Maintenance Due   Topic Date Due    Hepatitis C Screening  Never done    Shingles Vaccine (1 of 2) Never done    Pneumococcal Vaccines (Age 50+) (1 of 1 - PCV) Never done    RSV Vaccine (Age 60+ and Pregnant patients) (1 - Risk 60-74 years 1-dose series) Never done    Abdominal Aortic Aneurysm Screening  Never done    COVID-19 Vaccine (3 - 2024-25 season) 09/01/2024       1. Benign prostatic hyperplasia with incomplete bladder emptying  -     Urinalysis, Reflex to Urine Culture  -     PSA, Screening; Future; Expected date: 03/31/2025  -     Ambulatory referral/consult to Urology; Future; Expected date: 04/07/2025    2. Encounter for screening for malignant neoplasm of prostate  -     PSA, Screening; Future; Expected date: 03/31/2025    3. Chronic prostatitis  -     doxycycline (VIBRAMYCIN) 100 MG Cap; Take 1 capsule (100 mg total) by mouth every 12 (twelve) hours.  Dispense: 60 capsule; Refill: 0         Health Maintenance Topics with due status: Not Due       Topic Last Completion Date    TETANUS VACCINE 08/09/2023    Lipid Panel 11/11/2024       Future Appointments   Date Time Provider Department Center   5/14/2025  7:30 AM Moises Lei MD [facility]        There are no Patient Instructions on file for this visit.  Follow up if symptoms worsen or fail to improve.      Signature:  Moises Lei MD      Date of encounter: 3/31/25

## 2025-04-01 ENCOUNTER — RESULTS FOLLOW-UP (OUTPATIENT)
Dept: FAMILY MEDICINE | Facility: CLINIC | Age: 75
End: 2025-04-01

## 2025-05-14 ENCOUNTER — OFFICE VISIT (OUTPATIENT)
Dept: FAMILY MEDICINE | Facility: CLINIC | Age: 75
End: 2025-05-14
Payer: MEDICARE

## 2025-05-14 VITALS
SYSTOLIC BLOOD PRESSURE: 124 MMHG | BODY MASS INDEX: 30.97 KG/M2 | RESPIRATION RATE: 16 BRPM | OXYGEN SATURATION: 97 % | HEART RATE: 74 BPM | HEIGHT: 72 IN | DIASTOLIC BLOOD PRESSURE: 70 MMHG | WEIGHT: 228.63 LBS

## 2025-05-14 DIAGNOSIS — M54.9 BACK PAIN, UNSPECIFIED BACK LOCATION, UNSPECIFIED BACK PAIN LATERALITY, UNSPECIFIED CHRONICITY: Primary | ICD-10-CM

## 2025-05-14 DIAGNOSIS — Z79.899 ENCOUNTER FOR LONG-TERM (CURRENT) DRUG USE: ICD-10-CM

## 2025-05-14 DIAGNOSIS — Z12.5 ENCOUNTER FOR SCREENING FOR MALIGNANT NEOPLASM OF PROSTATE: ICD-10-CM

## 2025-05-14 PROCEDURE — 99213 OFFICE O/P EST LOW 20 MIN: CPT | Mod: ,,, | Performed by: FAMILY MEDICINE

## 2025-05-14 PROCEDURE — 80305 DRUG TEST PRSMV DIR OPT OBS: CPT | Mod: RHCUB | Performed by: FAMILY MEDICINE

## 2025-05-14 RX ORDER — TRAMADOL HYDROCHLORIDE 50 MG/1
TABLET, FILM COATED ORAL
Qty: 150 TABLET | Refills: 5 | Status: SHIPPED | OUTPATIENT
Start: 2025-05-14

## 2025-05-14 NOTE — PROGRESS NOTES
Moises Lei MD        PATIENT NAME: Brandt Nicole  : 1950  DATE: 25  MRN: [id number]      Billing Provider: Moises Lei MD  Level of Service: OR OFFICE/OUTPT VISIT, EST, LEVL III, 20-29 MIN  Patient PCP Information       Provider PCP Type    Moises Lei MD General            Reason for Visit / Chief Complaint: Chronic Pain (3 month med check)       Update PCP  Update Chief Complaint         History of Present Illness / Problem Focused Workflow     Brandt Nicole presents to the clinic with Chronic Pain (3 month med check)     Patient is accompanied by his wife.  He complains of continued chronic low back pain which is greatly relieved with the Ultram.  Also complains of a burning aching perineal pain after voiding at night.  He does have nocturia times 4.  The Rapaflo medication has helped some.  He is asymptomatic during the day.    Chronic Pain  Associated symptoms: no abdominal pain, no chest pain, no constipation, no dizziness, no headaches, no weakness (global weakness) and no nausea        Review of Systems     Review of Systems   Constitutional:  Negative for activity change, appetite change, fever and unexpected weight change.   HENT:  Negative for congestion, rhinorrhea, sinus pressure, sinus pain, sore throat and trouble swallowing.    Eyes:  Negative for photophobia, pain, discharge and visual disturbance.   Respiratory:  Negative for cough, chest tightness, wheezing and stridor.    Cardiovascular:  Negative for chest pain, palpitations and leg swelling.   Gastrointestinal:  Negative for abdominal pain, blood in stool, constipation, diarrhea and nausea.   Endocrine: Negative for polydipsia, polyphagia and polyuria.   Genitourinary:  Positive for dysuria. Negative for difficulty urinating, flank pain and hematuria.   Musculoskeletal:  Positive for back pain. Negative for arthralgias and neck pain.   Skin:  Negative for rash.   Allergic/Immunologic: Negative for food allergies.    Neurological:  Negative for dizziness, tremors, seizures, syncope, weakness (global weakness) and headaches.   Psychiatric/Behavioral:  Negative for behavioral problems, confusion, decreased concentration, dysphoric mood and hallucinations. The patient is not nervous/anxious.         Medical / Social / Family History     Past Medical History:   Diagnosis Date    Cancer     colon cancer.    Essential (primary) hypertension        Past Surgical History:   Procedure Laterality Date    PARTIAL HIP ARTHROPLASTY Right     TOTAL KNEE ARTHROPLASTY Bilateral        Social History    reports that he has quit smoking. His smoking use included cigarettes. He has been exposed to tobacco smoke. He has never used smokeless tobacco. He reports that he does not drink alcohol and does not use drugs.    Family History  's family history is not on file.    Medications and Allergies     Medications  No outpatient medications have been marked as taking for the 5/14/25 encounter (Office Visit) with Moises Lei MD.       Allergies  Review of patient's allergies indicates:   Allergen Reactions    Metoclopramide Other (See Comments)     Panic attack    Tamsulosin hcl Other (See Comments)     Panic attack    Levofloxacin      Anxiety attack       Physical Examination     Vitals:    05/14/25 0749   BP: 124/70   Pulse: 74   Resp: 16     Physical Exam  Constitutional:       General: He is not in acute distress.     Appearance: Normal appearance.   HENT:      Head: Normocephalic.      Right Ear: Tympanic membrane and ear canal normal.      Left Ear: Tympanic membrane and ear canal normal.      Nose: Nose normal.      Mouth/Throat:      Mouth: Mucous membranes are moist.      Pharynx: No oropharyngeal exudate.   Eyes:      Extraocular Movements: Extraocular movements intact.      Pupils: Pupils are equal, round, and reactive to light.   Cardiovascular:      Rate and Rhythm: Normal rate and regular rhythm.      Heart sounds: No murmur  heard.  Pulmonary:      Effort: Pulmonary effort is normal.      Breath sounds: Normal breath sounds. No wheezing.   Abdominal:      General: Abdomen is flat. Bowel sounds are normal.      Palpations: Abdomen is soft.      Hernia: No hernia is present.   Musculoskeletal:         General: Normal range of motion.      Cervical back: Normal range of motion and neck supple.      Right lower leg: No edema.      Left lower leg: No edema.   Lymphadenopathy:      Cervical: No cervical adenopathy.   Skin:     General: Skin is warm and dry.      Coloration: Skin is not jaundiced.      Findings: No lesion.   Neurological:      General: No focal deficit present.      Mental Status: He is alert and oriented to person, place, and time.      Cranial Nerves: No cranial nerve deficit.      Gait: Gait normal.   Psychiatric:         Mood and Affect: Mood normal.         Behavior: Behavior normal.         Judgment: Judgment normal.          Assessment and Plan (including Health Maintenance)      Problem List  Smart Sets  Document Outside HM   :    Plan:     1. Encounter for screening for malignant neoplasm of prostate      2. Encounter for long-term (current) drug use  The current medical regimen is effective;  continue present plan and medications.  -     POCT Urine Drug Screen Presump    3. Back pain, unspecified back location, unspecified back pain laterality, unspecified chronicity  The current medical regimen is effective;  continue present plan and medications.  -     traMADoL (ULTRAM) 50 mg tablet; 1-2 tabs po q 6 hours prn pain  Dispense: 150 tablet; Refill: 5          Health Maintenance Due   Topic Date Due    Hepatitis C Screening  Never done    Shingles Vaccine (1 of 2) Never done    Pneumococcal Vaccines (Age 50+) (1 of 1 - PCV) Never done    RSV Vaccine (Age 60+ and Pregnant patients) (1 - Risk 60-74 years 1-dose series) Never done    Abdominal Aortic Aneurysm Screening  Never done    COVID-19 Vaccine (3 - 2024-25 season)  09/01/2024       1. Back pain, unspecified back location, unspecified back pain laterality, unspecified chronicity  -     traMADoL (ULTRAM) 50 mg tablet; 1-2 tabs po q 6 hours prn pain  Dispense: 150 tablet; Refill: 5    2. Encounter for screening for malignant neoplasm of prostate    3. Encounter for long-term (current) drug use  -     POCT Urine Drug Screen Presump         Health Maintenance Topics with due status: Not Due       Topic Last Completion Date    TETANUS VACCINE 08/09/2023    Lipid Panel 11/11/2024       Future Appointments   Date Time Provider Department Center   11/17/2025  7:40 AM Ellis Parra MD [facility]   3/4/2026  2:00 PM Robby Gallardo MD [facility]      Patient has symptoms of nocturia and dysuria are very troubling at night.  His appointment with the Urology is in March of 2026.  I will try and get this moved up.  There are no Patient Instructions on file for this visit.  Follow up in about 6 months (around 11/14/2025) for routine followup.     Signature:  Moises Lei MD      Date of encounter: 5/14/25

## 2025-09-02 ENCOUNTER — OFFICE VISIT (OUTPATIENT)
Dept: FAMILY MEDICINE | Facility: CLINIC | Age: 75
End: 2025-09-02
Payer: MEDICARE

## 2025-09-02 VITALS
WEIGHT: 229 LBS | TEMPERATURE: 99 F | RESPIRATION RATE: 19 BRPM | HEART RATE: 85 BPM | SYSTOLIC BLOOD PRESSURE: 157 MMHG | DIASTOLIC BLOOD PRESSURE: 82 MMHG | BODY MASS INDEX: 31.02 KG/M2 | HEIGHT: 72 IN | OXYGEN SATURATION: 98 %

## 2025-09-02 DIAGNOSIS — N41.1 CHRONIC PROSTATITIS: ICD-10-CM

## 2025-09-02 DIAGNOSIS — N48.89 PENILE PAIN: Primary | ICD-10-CM

## 2025-09-02 DIAGNOSIS — K94.00 COLOSTOMY COMPLICATION: ICD-10-CM

## 2025-09-02 DIAGNOSIS — Z85.038 HISTORY OF COLON CANCER: ICD-10-CM

## 2025-09-02 DIAGNOSIS — G89.4 CHRONIC PAIN DISORDER: ICD-10-CM

## 2025-09-02 LAB
BILIRUB UR QL STRIP: NEGATIVE
CLARITY UR: CLEAR
COLOR UR: COLORLESS
GLUCOSE UR STRIP-MCNC: NORMAL MG/DL
KETONES UR STRIP-SCNC: NEGATIVE MG/DL
LEUKOCYTE ESTERASE UR QL STRIP: NEGATIVE
NITRITE UR QL STRIP: NEGATIVE
PH UR STRIP: 5.5 PH UNITS
PROT UR QL STRIP: NEGATIVE
RBC # UR STRIP: NEGATIVE /UL
SP GR UR STRIP: 1
UROBILINOGEN UR STRIP-ACNC: NORMAL MG/DL

## 2025-09-02 PROCEDURE — 81003 URINALYSIS AUTO W/O SCOPE: CPT | Mod: QW,,, | Performed by: CLINICAL MEDICAL LABORATORY
